# Patient Record
Sex: FEMALE | Race: WHITE | NOT HISPANIC OR LATINO
[De-identification: names, ages, dates, MRNs, and addresses within clinical notes are randomized per-mention and may not be internally consistent; named-entity substitution may affect disease eponyms.]

---

## 2014-11-05 RX ORDER — SERTRALINE 25 MG/1
1 TABLET, FILM COATED ORAL
Qty: 0 | Refills: 0 | DISCHARGE
Start: 2014-11-05

## 2017-01-18 ENCOUNTER — RX RENEWAL (OUTPATIENT)
Age: 52
End: 2017-01-18

## 2017-02-16 ENCOUNTER — APPOINTMENT (OUTPATIENT)
Dept: NEUROLOGY | Facility: CLINIC | Age: 52
End: 2017-02-16

## 2017-02-16 VITALS
SYSTOLIC BLOOD PRESSURE: 108 MMHG | HEART RATE: 80 BPM | OXYGEN SATURATION: 99 % | HEIGHT: 65 IN | DIASTOLIC BLOOD PRESSURE: 74 MMHG

## 2017-02-16 DIAGNOSIS — I95.1 ORTHOSTATIC HYPOTENSION: ICD-10-CM

## 2017-06-22 ENCOUNTER — APPOINTMENT (OUTPATIENT)
Dept: NEUROLOGY | Facility: CLINIC | Age: 52
End: 2017-06-22

## 2017-06-22 VITALS
OXYGEN SATURATION: 98 % | DIASTOLIC BLOOD PRESSURE: 82 MMHG | HEIGHT: 65 IN | BODY MASS INDEX: 27.82 KG/M2 | SYSTOLIC BLOOD PRESSURE: 120 MMHG | WEIGHT: 167 LBS | HEART RATE: 69 BPM

## 2018-01-24 ENCOUNTER — APPOINTMENT (OUTPATIENT)
Dept: NEUROLOGY | Facility: CLINIC | Age: 53
End: 2018-01-24
Payer: MEDICARE

## 2018-01-24 VITALS
WEIGHT: 175 LBS | HEART RATE: 81 BPM | BODY MASS INDEX: 29.16 KG/M2 | HEIGHT: 65 IN | SYSTOLIC BLOOD PRESSURE: 128 MMHG | DIASTOLIC BLOOD PRESSURE: 83 MMHG

## 2018-01-24 DIAGNOSIS — I63.9 CEREBRAL INFARCTION, UNSPECIFIED: ICD-10-CM

## 2018-01-24 PROCEDURE — 99214 OFFICE O/P EST MOD 30 MIN: CPT

## 2018-01-24 RX ORDER — ALPRAZOLAM 0.5 MG/1
0.5 TABLET ORAL
Qty: 45 | Refills: 0 | Status: ACTIVE | COMMUNITY
Start: 2017-08-08

## 2018-01-24 RX ORDER — PREDNISONE 5 MG/1
5 TABLET ORAL
Refills: 0 | Status: ACTIVE | COMMUNITY

## 2018-07-25 ENCOUNTER — APPOINTMENT (OUTPATIENT)
Dept: NEUROLOGY | Facility: CLINIC | Age: 53
End: 2018-07-25

## 2018-07-31 ENCOUNTER — APPOINTMENT (OUTPATIENT)
Dept: PSYCHIATRY | Facility: CLINIC | Age: 53
End: 2018-07-31
Payer: MEDICARE

## 2018-07-31 PROCEDURE — 99205 OFFICE O/P NEW HI 60 MIN: CPT

## 2018-09-04 ENCOUNTER — APPOINTMENT (OUTPATIENT)
Dept: PSYCHIATRY | Facility: CLINIC | Age: 53
End: 2018-09-04
Payer: MEDICARE

## 2018-09-04 DIAGNOSIS — K31.84 GASTROPARESIS: ICD-10-CM

## 2018-09-04 PROCEDURE — 99214 OFFICE O/P EST MOD 30 MIN: CPT

## 2018-09-04 RX ORDER — PREDNISONE 5 MG/1
5 TABLET ORAL
Qty: 135 | Refills: 0 | Status: DISCONTINUED | COMMUNITY
Start: 2017-09-20 | End: 2018-09-04

## 2018-09-04 RX ORDER — PREDNISONE 2.5 MG/1
2.5 TABLET ORAL
Refills: 0 | Status: DISCONTINUED | COMMUNITY
End: 2018-09-04

## 2018-09-04 RX ORDER — LEVOTHYROXINE SODIUM 150 UG/1
150 TABLET ORAL
Refills: 0 | Status: DISCONTINUED | COMMUNITY
End: 2018-09-04

## 2018-10-09 ENCOUNTER — APPOINTMENT (OUTPATIENT)
Dept: PSYCHIATRY | Facility: CLINIC | Age: 53
End: 2018-10-09
Payer: MEDICARE

## 2018-10-09 PROCEDURE — 99213 OFFICE O/P EST LOW 20 MIN: CPT

## 2019-01-08 ENCOUNTER — APPOINTMENT (OUTPATIENT)
Dept: PSYCHIATRY | Facility: CLINIC | Age: 54
End: 2019-01-08
Payer: MEDICARE

## 2019-01-08 DIAGNOSIS — F13.20 SEDATIVE, HYPNOTIC OR ANXIOLYTIC DEPENDENCE, UNCOMPLICATED: ICD-10-CM

## 2019-01-08 PROCEDURE — 99213 OFFICE O/P EST LOW 20 MIN: CPT

## 2019-01-08 RX ORDER — TRAZODONE HYDROCHLORIDE 50 MG/1
50 TABLET ORAL
Qty: 90 | Refills: 0 | Status: DISCONTINUED | COMMUNITY
Start: 2018-07-31 | End: 2019-01-08

## 2019-01-08 RX ORDER — ALPRAZOLAM 0.5 MG/1
0.5 TABLET ORAL
Qty: 30 | Refills: 0 | Status: DISCONTINUED | COMMUNITY
Start: 2018-07-31 | End: 2019-01-08

## 2019-03-11 ENCOUNTER — TRANSCRIPTION ENCOUNTER (OUTPATIENT)
Age: 54
End: 2019-03-11

## 2019-04-09 ENCOUNTER — APPOINTMENT (OUTPATIENT)
Dept: PSYCHIATRY | Facility: CLINIC | Age: 54
End: 2019-04-09
Payer: MEDICARE

## 2019-04-09 PROCEDURE — 99214 OFFICE O/P EST MOD 30 MIN: CPT

## 2019-07-09 ENCOUNTER — APPOINTMENT (OUTPATIENT)
Dept: PSYCHIATRY | Facility: CLINIC | Age: 54
End: 2019-07-09
Payer: MEDICARE

## 2019-07-09 PROCEDURE — 99213 OFFICE O/P EST LOW 20 MIN: CPT

## 2019-10-16 ENCOUNTER — APPOINTMENT (OUTPATIENT)
Dept: PSYCHIATRY | Facility: CLINIC | Age: 54
End: 2019-10-16
Payer: MEDICARE

## 2019-10-16 PROCEDURE — 99213 OFFICE O/P EST LOW 20 MIN: CPT

## 2019-10-16 RX ORDER — ONDANSETRON 4 MG/1
4 TABLET ORAL AS DIRECTED
Qty: 30 | Refills: 0 | Status: ACTIVE | COMMUNITY
Start: 2019-10-16 | End: 1900-01-01

## 2019-10-16 RX ORDER — GABAPENTIN 600 MG/1
600 TABLET, COATED ORAL
Qty: 270 | Refills: 0 | Status: DISCONTINUED | COMMUNITY
Start: 2018-07-31 | End: 2019-10-16

## 2019-11-13 ENCOUNTER — APPOINTMENT (OUTPATIENT)
Dept: PSYCHIATRY | Facility: CLINIC | Age: 54
End: 2019-11-13
Payer: MEDICARE

## 2019-11-13 PROCEDURE — 99214 OFFICE O/P EST MOD 30 MIN: CPT

## 2019-11-13 PROCEDURE — 90837 PSYTX W PT 60 MINUTES: CPT

## 2019-12-09 ENCOUNTER — APPOINTMENT (OUTPATIENT)
Dept: PSYCHIATRY | Facility: CLINIC | Age: 54
End: 2019-12-09
Payer: MEDICARE

## 2019-12-09 PROCEDURE — 90837 PSYTX W PT 60 MINUTES: CPT

## 2019-12-09 PROCEDURE — 99214 OFFICE O/P EST MOD 30 MIN: CPT

## 2019-12-09 RX ORDER — SERTRALINE HYDROCHLORIDE 50 MG/1
50 TABLET, FILM COATED ORAL DAILY
Qty: 30 | Refills: 0 | Status: DISCONTINUED | COMMUNITY
Start: 2019-11-13 | End: 2019-12-09

## 2019-12-09 RX ORDER — TRAZODONE HYDROCHLORIDE 50 MG/1
50 TABLET ORAL
Qty: 30 | Refills: 0 | Status: DISCONTINUED | COMMUNITY
Start: 2019-10-16 | End: 2019-12-09

## 2019-12-09 RX ORDER — MELATONIN 5 MG
5 CAPSULE ORAL
Qty: 30 | Refills: 0 | Status: DISCONTINUED | COMMUNITY
Start: 2019-11-13 | End: 2019-12-09

## 2019-12-23 ENCOUNTER — APPOINTMENT (OUTPATIENT)
Dept: PSYCHIATRY | Facility: CLINIC | Age: 54
End: 2019-12-23

## 2020-01-06 ENCOUNTER — APPOINTMENT (OUTPATIENT)
Dept: PSYCHIATRY | Facility: CLINIC | Age: 55
End: 2020-01-06
Payer: MEDICARE

## 2020-01-06 PROCEDURE — 99214 OFFICE O/P EST MOD 30 MIN: CPT

## 2020-01-06 PROCEDURE — 90837 PSYTX W PT 60 MINUTES: CPT

## 2020-01-06 RX ORDER — DOXEPIN HYDROCHLORIDE 10 MG/1
10 CAPSULE ORAL
Qty: 30 | Refills: 0 | Status: COMPLETED | COMMUNITY
Start: 2019-12-09 | End: 2020-01-06

## 2020-02-03 ENCOUNTER — APPOINTMENT (OUTPATIENT)
Dept: PSYCHIATRY | Facility: CLINIC | Age: 55
End: 2020-02-03
Payer: MEDICARE

## 2020-02-03 PROCEDURE — 90837 PSYTX W PT 60 MINUTES: CPT

## 2020-02-03 PROCEDURE — 99214 OFFICE O/P EST MOD 30 MIN: CPT

## 2020-03-09 ENCOUNTER — APPOINTMENT (OUTPATIENT)
Dept: PSYCHIATRY | Facility: CLINIC | Age: 55
End: 2020-03-09
Payer: MEDICARE

## 2020-03-09 PROCEDURE — 90837 PSYTX W PT 60 MINUTES: CPT

## 2020-04-06 ENCOUNTER — APPOINTMENT (OUTPATIENT)
Dept: PSYCHIATRY | Facility: CLINIC | Age: 55
End: 2020-04-06
Payer: MEDICARE

## 2020-04-06 PROCEDURE — 90837 PSYTX W PT 60 MINUTES: CPT | Mod: 95

## 2020-05-01 ENCOUNTER — RX RENEWAL (OUTPATIENT)
Age: 55
End: 2020-05-01

## 2020-05-04 ENCOUNTER — APPOINTMENT (OUTPATIENT)
Dept: PSYCHIATRY | Facility: CLINIC | Age: 55
End: 2020-05-04
Payer: MEDICARE

## 2020-05-04 PROCEDURE — 90837 PSYTX W PT 60 MINUTES: CPT | Mod: 95

## 2020-05-04 PROCEDURE — 99214 OFFICE O/P EST MOD 30 MIN: CPT | Mod: 95

## 2020-05-26 ENCOUNTER — RX RENEWAL (OUTPATIENT)
Age: 55
End: 2020-05-26

## 2020-06-01 ENCOUNTER — APPOINTMENT (OUTPATIENT)
Dept: PSYCHIATRY | Facility: CLINIC | Age: 55
End: 2020-06-01
Payer: MEDICARE

## 2020-06-01 PROCEDURE — 90837 PSYTX W PT 60 MINUTES: CPT | Mod: 95

## 2020-06-29 ENCOUNTER — APPOINTMENT (OUTPATIENT)
Dept: PSYCHIATRY | Facility: CLINIC | Age: 55
End: 2020-06-29
Payer: MEDICARE

## 2020-06-29 PROCEDURE — 90837 PSYTX W PT 60 MINUTES: CPT | Mod: 95

## 2020-07-27 ENCOUNTER — APPOINTMENT (OUTPATIENT)
Dept: PSYCHIATRY | Facility: CLINIC | Age: 55
End: 2020-07-27

## 2020-08-03 ENCOUNTER — APPOINTMENT (OUTPATIENT)
Dept: PSYCHIATRY | Facility: CLINIC | Age: 55
End: 2020-08-03
Payer: MEDICARE

## 2020-08-03 PROCEDURE — 99214 OFFICE O/P EST MOD 30 MIN: CPT | Mod: 95

## 2020-10-28 ENCOUNTER — APPOINTMENT (OUTPATIENT)
Dept: PSYCHIATRY | Facility: CLINIC | Age: 55
End: 2020-10-28
Payer: MEDICARE

## 2020-10-28 PROCEDURE — 90837 PSYTX W PT 60 MINUTES: CPT | Mod: 95

## 2020-10-28 PROCEDURE — 99214 OFFICE O/P EST MOD 30 MIN: CPT | Mod: 95

## 2020-12-04 ENCOUNTER — APPOINTMENT (OUTPATIENT)
Dept: PSYCHIATRY | Facility: CLINIC | Age: 55
End: 2020-12-04
Payer: MEDICARE

## 2020-12-04 PROCEDURE — 90837 PSYTX W PT 60 MINUTES: CPT | Mod: 95

## 2020-12-23 ENCOUNTER — RX RENEWAL (OUTPATIENT)
Age: 55
End: 2020-12-23

## 2021-01-07 ENCOUNTER — APPOINTMENT (OUTPATIENT)
Dept: PSYCHIATRY | Facility: CLINIC | Age: 56
End: 2021-01-07
Payer: MEDICARE

## 2021-01-07 PROCEDURE — 90837 PSYTX W PT 60 MINUTES: CPT | Mod: 95

## 2021-02-01 ENCOUNTER — APPOINTMENT (OUTPATIENT)
Dept: PSYCHIATRY | Facility: CLINIC | Age: 56
End: 2021-02-01

## 2021-02-09 ENCOUNTER — APPOINTMENT (OUTPATIENT)
Dept: PSYCHIATRY | Facility: CLINIC | Age: 56
End: 2021-02-09
Payer: MEDICARE

## 2021-02-09 PROCEDURE — 99214 OFFICE O/P EST MOD 30 MIN: CPT | Mod: 95

## 2021-02-10 ENCOUNTER — APPOINTMENT (OUTPATIENT)
Dept: PSYCHIATRY | Facility: CLINIC | Age: 56
End: 2021-02-10
Payer: MEDICARE

## 2021-02-10 PROCEDURE — 90837 PSYTX W PT 60 MINUTES: CPT | Mod: 95

## 2021-03-09 ENCOUNTER — APPOINTMENT (OUTPATIENT)
Dept: PSYCHIATRY | Facility: CLINIC | Age: 56
End: 2021-03-09
Payer: MEDICARE

## 2021-03-09 PROCEDURE — 90834 PSYTX W PT 45 MINUTES: CPT | Mod: 95

## 2021-04-13 ENCOUNTER — APPOINTMENT (OUTPATIENT)
Dept: PSYCHIATRY | Facility: CLINIC | Age: 56
End: 2021-04-13
Payer: MEDICARE

## 2021-04-13 PROCEDURE — 90834 PSYTX W PT 45 MINUTES: CPT | Mod: 95

## 2021-05-11 ENCOUNTER — APPOINTMENT (OUTPATIENT)
Dept: PSYCHIATRY | Facility: CLINIC | Age: 56
End: 2021-05-11
Payer: MEDICARE

## 2021-05-11 PROCEDURE — 90837 PSYTX W PT 60 MINUTES: CPT | Mod: 95

## 2021-06-07 ENCOUNTER — APPOINTMENT (OUTPATIENT)
Dept: PSYCHIATRY | Facility: CLINIC | Age: 56
End: 2021-06-07
Payer: MEDICARE

## 2021-06-07 DIAGNOSIS — G47.00 INSOMNIA, UNSPECIFIED: ICD-10-CM

## 2021-06-07 PROCEDURE — 90837 PSYTX W PT 60 MINUTES: CPT | Mod: 95

## 2021-06-08 PROBLEM — G47.00 INSOMNIA: Status: ACTIVE | Noted: 2019-10-16

## 2021-06-09 ENCOUNTER — APPOINTMENT (OUTPATIENT)
Dept: PSYCHIATRY | Facility: CLINIC | Age: 56
End: 2021-06-09
Payer: MEDICARE

## 2021-06-09 PROCEDURE — 99214 OFFICE O/P EST MOD 30 MIN: CPT | Mod: 95

## 2021-07-02 ENCOUNTER — RX RENEWAL (OUTPATIENT)
Age: 56
End: 2021-07-02

## 2021-07-09 ENCOUNTER — APPOINTMENT (OUTPATIENT)
Dept: PSYCHIATRY | Facility: CLINIC | Age: 56
End: 2021-07-09
Payer: MEDICARE

## 2021-07-09 PROCEDURE — 99214 OFFICE O/P EST MOD 30 MIN: CPT | Mod: 95

## 2021-07-19 ENCOUNTER — APPOINTMENT (OUTPATIENT)
Dept: PSYCHIATRY | Facility: CLINIC | Age: 56
End: 2021-07-19
Payer: MEDICARE

## 2021-07-19 PROCEDURE — 90837 PSYTX W PT 60 MINUTES: CPT | Mod: 95

## 2021-08-16 ENCOUNTER — APPOINTMENT (OUTPATIENT)
Dept: PSYCHIATRY | Facility: CLINIC | Age: 56
End: 2021-08-16
Payer: MEDICARE

## 2021-08-16 PROCEDURE — 90837 PSYTX W PT 60 MINUTES: CPT | Mod: 95

## 2021-08-19 ENCOUNTER — RX RENEWAL (OUTPATIENT)
Age: 56
End: 2021-08-19

## 2021-09-13 ENCOUNTER — APPOINTMENT (OUTPATIENT)
Dept: PSYCHIATRY | Facility: CLINIC | Age: 56
End: 2021-09-13
Payer: MEDICARE

## 2021-09-13 PROCEDURE — 90837 PSYTX W PT 60 MINUTES: CPT | Mod: 95

## 2021-09-15 ENCOUNTER — RX RENEWAL (OUTPATIENT)
Age: 56
End: 2021-09-15

## 2021-10-04 ENCOUNTER — APPOINTMENT (OUTPATIENT)
Dept: PSYCHIATRY | Facility: CLINIC | Age: 56
End: 2021-10-04
Payer: MEDICARE

## 2021-10-04 PROCEDURE — 99214 OFFICE O/P EST MOD 30 MIN: CPT | Mod: 95

## 2021-10-14 ENCOUNTER — APPOINTMENT (OUTPATIENT)
Dept: PSYCHIATRY | Facility: CLINIC | Age: 56
End: 2021-10-14
Payer: MEDICARE

## 2021-10-14 PROCEDURE — 90837 PSYTX W PT 60 MINUTES: CPT

## 2021-10-27 ENCOUNTER — APPOINTMENT (OUTPATIENT)
Dept: OTOLARYNGOLOGY | Facility: CLINIC | Age: 56
End: 2021-10-27
Payer: MEDICARE

## 2021-10-27 VITALS
WEIGHT: 155 LBS | TEMPERATURE: 97.3 F | HEART RATE: 77 BPM | SYSTOLIC BLOOD PRESSURE: 126 MMHG | DIASTOLIC BLOOD PRESSURE: 70 MMHG | HEIGHT: 65 IN | OXYGEN SATURATION: 99 % | BODY MASS INDEX: 25.83 KG/M2

## 2021-10-27 DIAGNOSIS — H92.03 OTALGIA, BILATERAL: ICD-10-CM

## 2021-10-27 DIAGNOSIS — H61.20 IMPACTED CERUMEN, UNSPECIFIED EAR: ICD-10-CM

## 2021-10-27 DIAGNOSIS — H92.09 OTALGIA, UNSPECIFIED EAR: ICD-10-CM

## 2021-10-27 PROCEDURE — 69210 REMOVE IMPACTED EAR WAX UNI: CPT

## 2021-10-27 PROCEDURE — 99203 OFFICE O/P NEW LOW 30 MIN: CPT | Mod: 25

## 2021-10-27 NOTE — PROCEDURE
[FreeTextEntry1] : Bilateral cerumen removal under microscopy [FreeTextEntry2] : Aural fullness [FreeTextEntry3] : Procedure: Removal of bilateral cerumen with microscopy assistance\par \par Pre-operative diagnosis: Aural fullness\par \par Details:\par A speculum was placed into the right external auditory canal and the ear canal and tympanic membrane was viewed under otomicroscopy. Cerumen was present within the canal. This was removed using suction and ear curette. The tympanic membrane was viewed intact. There was no evidence of middle ear effusion. An identical procedure was performed on the left side. The tympanic membrane was intact. There was no evidence of middle ear effusion.\par \par Post-operative diagnosis: bilateral cerumen

## 2021-10-27 NOTE — ASSESSMENT
[FreeTextEntry1] : 56 year old female with aural fullness secondary to cerumen impaction. Cerumen removed today.

## 2021-10-27 NOTE — REASON FOR VISIT
[Initial Evaluation] : an initial evaluation for [Ear Pain] : ear pain [FreeTextEntry2] : 56 year old female with aural fullness

## 2021-10-27 NOTE — HISTORY OF PRESENT ILLNESS
[No] : patient does not have a  history of radiation therapy [de-identified] : 56 year old female with ear pain and fullness since Friday. She tried debrox on Saturday twice and Sunday, and has been using continuously until yesterday. It has made it worse. Reports decreased hearing, denies tinnitus, vertigo, drainage.  [Ear Fullness] : ear fullness [None] : No associated symptoms are reported.

## 2021-11-22 ENCOUNTER — APPOINTMENT (OUTPATIENT)
Dept: PSYCHIATRY | Facility: CLINIC | Age: 56
End: 2021-11-22
Payer: MEDICARE

## 2021-11-22 PROCEDURE — 90834 PSYTX W PT 45 MINUTES: CPT

## 2021-12-09 ENCOUNTER — APPOINTMENT (OUTPATIENT)
Dept: OBGYN | Facility: CLINIC | Age: 56
End: 2021-12-09

## 2021-12-09 ENCOUNTER — APPOINTMENT (OUTPATIENT)
Dept: OBGYN | Facility: CLINIC | Age: 56
End: 2021-12-09
Payer: MEDICARE

## 2021-12-09 VITALS
SYSTOLIC BLOOD PRESSURE: 100 MMHG | DIASTOLIC BLOOD PRESSURE: 60 MMHG | BODY MASS INDEX: 24.99 KG/M2 | WEIGHT: 150 LBS | HEIGHT: 65 IN

## 2021-12-09 DIAGNOSIS — N83.201 UNSPECIFIED OVARIAN CYST, RIGHT SIDE: ICD-10-CM

## 2021-12-09 DIAGNOSIS — N83.202 UNSPECIFIED OVARIAN CYST, RIGHT SIDE: ICD-10-CM

## 2021-12-09 DIAGNOSIS — N83.209 UNSPECIFIED OVARIAN CYST, UNSPECIFIED SIDE: ICD-10-CM

## 2021-12-09 PROCEDURE — 99203 OFFICE O/P NEW LOW 30 MIN: CPT

## 2021-12-09 RX ORDER — LEVOTHYROXINE SODIUM 150 UG/1
150 TABLET ORAL
Refills: 0 | Status: ACTIVE | COMMUNITY
Start: 2021-12-09

## 2021-12-09 RX ORDER — ATORVASTATIN CALCIUM 10 MG/1
10 TABLET, FILM COATED ORAL
Refills: 0 | Status: ACTIVE | COMMUNITY
Start: 2021-12-09

## 2021-12-09 RX ORDER — TOPIRAMATE 100 MG/1
100 TABLET, COATED ORAL
Refills: 0 | Status: ACTIVE | COMMUNITY
Start: 2021-12-09

## 2021-12-09 NOTE — PLAN
[FreeTextEntry1] : Discussed the management of complex ovarian cysts at length.  Risks including but not limited to torsion, rupture and malignancy were discussed.  With findings consistent with probable pathologic ovarian cyst recommend surgical intervention.  Patient to have Chastity today and dependent on the findings we will consider conservative management versus surgical management due to the patient's extensive medical history if Howe negative patient to have follow-up ultrasound in 3 months and will discuss plan upon obtaining the results.\par

## 2021-12-09 NOTE — HISTORY OF PRESENT ILLNESS
[FreeTextEntry1] : Pt is a 57 yo P3 postmenopausal woman (LMP= 10 years  ago) presenting for consultation for ovarian cyst. She has had these cysts for several years and they were found when she presented to her gyn with pelvic pain. Pain is dscribed as a dull ache on both sides.  NO AUB. She has a significant PMH of Cushing disease, CVA and would like to consider to have her adenxa removed to prevent further problems in the future. \par \par TVUS: 2021: uterus is 5x2.5x3.6cm, endometrium is 0.7mm\par posterior fundal subendometrial cyst measuring  5y1m3cf\par RO: Miniamlly complex cyst with septations 3.5X3.5X2.9 CM\par LO:  Minimally compex cyst with septation 4.9x3.4x3.6cm, \par \par \par \par \par OBHx:  x3 \par GYNHX: denied hx of abn pap\par PMSH: Cushing disease, CVA/stroke, depression, anxiety\par PSHx: s/p lsc cholecystectomy, TOT, excision of pituitary gland\par Meds:asa, Bupropion, DDAVP, prednisone, Sertraline , Zolof, Synthroid \par All: Sulfa \par \par Endo= ALEX Fernando\par GI= Dr. Adelia NG\par Neurologist= Dr. Bell \par

## 2021-12-20 ENCOUNTER — APPOINTMENT (OUTPATIENT)
Dept: PSYCHIATRY | Facility: CLINIC | Age: 56
End: 2021-12-20
Payer: MEDICARE

## 2021-12-20 PROCEDURE — 90837 PSYTX W PT 60 MINUTES: CPT

## 2022-01-10 LAB
CA 125 (LABCORP): 16.3 U/ML
HE4X: 60.5 PMOL/L
POSTMENOPAUSAL ROMA: 1.44
PREMENOPAUSAL ROMA: 1.13
ROMA COMMENT: NORMAL

## 2022-01-25 ENCOUNTER — APPOINTMENT (OUTPATIENT)
Dept: PSYCHIATRY | Facility: CLINIC | Age: 57
End: 2022-01-25
Payer: MEDICARE

## 2022-01-25 PROCEDURE — 90837 PSYTX W PT 60 MINUTES: CPT | Mod: 95

## 2022-01-25 PROCEDURE — 99214 OFFICE O/P EST MOD 30 MIN: CPT | Mod: 95

## 2022-01-25 RX ORDER — BUPROPION HYDROCHLORIDE 75 MG/1
75 TABLET, FILM COATED ORAL
Qty: 30 | Refills: 0 | Status: COMPLETED | COMMUNITY
Start: 2021-06-09 | End: 2022-01-25

## 2022-02-22 ENCOUNTER — APPOINTMENT (OUTPATIENT)
Dept: PSYCHIATRY | Facility: CLINIC | Age: 57
End: 2022-02-22
Payer: MEDICARE

## 2022-02-22 PROCEDURE — 90837 PSYTX W PT 60 MINUTES: CPT | Mod: 95

## 2022-02-28 ENCOUNTER — RX RENEWAL (OUTPATIENT)
Age: 57
End: 2022-02-28

## 2022-03-22 ENCOUNTER — APPOINTMENT (OUTPATIENT)
Dept: PSYCHIATRY | Facility: CLINIC | Age: 57
End: 2022-03-22
Payer: MEDICARE

## 2022-03-22 PROCEDURE — 90837 PSYTX W PT 60 MINUTES: CPT | Mod: 95

## 2022-04-16 ENCOUNTER — APPOINTMENT (OUTPATIENT)
Dept: OBGYN | Facility: CLINIC | Age: 57
End: 2022-04-16
Payer: MEDICARE

## 2022-04-26 ENCOUNTER — APPOINTMENT (OUTPATIENT)
Dept: PSYCHIATRY | Facility: CLINIC | Age: 57
End: 2022-04-26
Payer: MEDICARE

## 2022-04-26 PROCEDURE — 90837 PSYTX W PT 60 MINUTES: CPT

## 2022-04-30 ENCOUNTER — NON-APPOINTMENT (OUTPATIENT)
Age: 57
End: 2022-04-30

## 2022-04-30 ENCOUNTER — APPOINTMENT (OUTPATIENT)
Dept: OBGYN | Facility: CLINIC | Age: 57
End: 2022-04-30
Payer: MEDICARE

## 2022-04-30 PROCEDURE — 99443: CPT | Mod: 95

## 2022-05-09 ENCOUNTER — NON-APPOINTMENT (OUTPATIENT)
Age: 57
End: 2022-05-09

## 2022-05-17 ENCOUNTER — APPOINTMENT (OUTPATIENT)
Dept: OBGYN | Facility: CLINIC | Age: 57
End: 2022-05-17
Payer: MEDICARE

## 2022-05-17 PROCEDURE — 36415 COLL VENOUS BLD VENIPUNCTURE: CPT

## 2022-05-25 ENCOUNTER — APPOINTMENT (OUTPATIENT)
Dept: PSYCHIATRY | Facility: CLINIC | Age: 57
End: 2022-05-25
Payer: MEDICARE

## 2022-05-25 PROCEDURE — 90837 PSYTX W PT 60 MINUTES: CPT

## 2022-05-27 ENCOUNTER — APPOINTMENT (OUTPATIENT)
Dept: PSYCHIATRY | Facility: CLINIC | Age: 57
End: 2022-05-27
Payer: MEDICARE

## 2022-05-27 PROCEDURE — 99214 OFFICE O/P EST MOD 30 MIN: CPT | Mod: 95

## 2022-06-12 LAB
CA 125 (LABCORP): 14.9 U/ML
HE4X: 78.8 PMOL/L
POSTMENOPAUSAL ROMA: 1.72
PREMENOPAUSAL ROMA: 1.92
ROMA COMMENT: NORMAL

## 2022-06-27 ENCOUNTER — OUTPATIENT (OUTPATIENT)
Dept: OUTPATIENT SERVICES | Facility: HOSPITAL | Age: 57
LOS: 1 days | End: 2022-06-27
Payer: MEDICARE

## 2022-06-27 VITALS
WEIGHT: 138.01 LBS | TEMPERATURE: 98 F | HEART RATE: 61 BPM | RESPIRATION RATE: 17 BRPM | OXYGEN SATURATION: 99 % | HEIGHT: 65 IN | SYSTOLIC BLOOD PRESSURE: 113 MMHG | DIASTOLIC BLOOD PRESSURE: 79 MMHG

## 2022-06-27 DIAGNOSIS — Z01.818 ENCOUNTER FOR OTHER PREPROCEDURAL EXAMINATION: ICD-10-CM

## 2022-06-27 DIAGNOSIS — Z98.89 OTHER SPECIFIED POSTPROCEDURAL STATES: Chronic | ICD-10-CM

## 2022-06-27 DIAGNOSIS — N83.209 UNSPECIFIED OVARIAN CYST, UNSPECIFIED SIDE: ICD-10-CM

## 2022-06-27 LAB
ANION GAP SERPL CALC-SCNC: 9 MMOL/L — SIGNIFICANT CHANGE UP (ref 5–17)
BLD GP AB SCN SERPL QL: NEGATIVE — SIGNIFICANT CHANGE UP
BUN SERPL-MCNC: 22 MG/DL — SIGNIFICANT CHANGE UP (ref 7–23)
CALCIUM SERPL-MCNC: 9.1 MG/DL — SIGNIFICANT CHANGE UP (ref 8.4–10.5)
CHLORIDE SERPL-SCNC: 106 MMOL/L — SIGNIFICANT CHANGE UP (ref 96–108)
CO2 SERPL-SCNC: 25 MMOL/L — SIGNIFICANT CHANGE UP (ref 22–31)
CREAT SERPL-MCNC: 0.92 MG/DL — SIGNIFICANT CHANGE UP (ref 0.5–1.3)
EGFR: 72 ML/MIN/1.73M2 — SIGNIFICANT CHANGE UP
GLUCOSE SERPL-MCNC: 88 MG/DL — SIGNIFICANT CHANGE UP (ref 70–99)
HCT VFR BLD CALC: 35 % — SIGNIFICANT CHANGE UP (ref 34.5–45)
HGB BLD-MCNC: 11.4 G/DL — LOW (ref 11.5–15.5)
MCHC RBC-ENTMCNC: 28.4 PG — SIGNIFICANT CHANGE UP (ref 27–34)
MCHC RBC-ENTMCNC: 32.6 GM/DL — SIGNIFICANT CHANGE UP (ref 32–36)
MCV RBC AUTO: 87.1 FL — SIGNIFICANT CHANGE UP (ref 80–100)
NRBC # BLD: 0 /100 WBCS — SIGNIFICANT CHANGE UP (ref 0–0)
PLATELET # BLD AUTO: 154 K/UL — SIGNIFICANT CHANGE UP (ref 150–400)
POTASSIUM SERPL-MCNC: 3.5 MMOL/L — SIGNIFICANT CHANGE UP (ref 3.5–5.3)
POTASSIUM SERPL-SCNC: 3.5 MMOL/L — SIGNIFICANT CHANGE UP (ref 3.5–5.3)
RBC # BLD: 4.02 M/UL — SIGNIFICANT CHANGE UP (ref 3.8–5.2)
RBC # FLD: 13.1 % — SIGNIFICANT CHANGE UP (ref 10.3–14.5)
RH IG SCN BLD-IMP: POSITIVE — SIGNIFICANT CHANGE UP
SODIUM SERPL-SCNC: 140 MMOL/L — SIGNIFICANT CHANGE UP (ref 135–145)
WBC # BLD: 5.65 K/UL — SIGNIFICANT CHANGE UP (ref 3.8–10.5)
WBC # FLD AUTO: 5.65 K/UL — SIGNIFICANT CHANGE UP (ref 3.8–10.5)

## 2022-06-27 PROCEDURE — 86900 BLOOD TYPING SEROLOGIC ABO: CPT

## 2022-06-27 PROCEDURE — 36415 COLL VENOUS BLD VENIPUNCTURE: CPT

## 2022-06-27 PROCEDURE — 86850 RBC ANTIBODY SCREEN: CPT

## 2022-06-27 PROCEDURE — 85027 COMPLETE CBC AUTOMATED: CPT

## 2022-06-27 PROCEDURE — 80048 BASIC METABOLIC PNL TOTAL CA: CPT

## 2022-06-27 PROCEDURE — 86901 BLOOD TYPING SEROLOGIC RH(D): CPT

## 2022-06-27 PROCEDURE — G0463: CPT

## 2022-06-27 RX ORDER — ACETAMINOPHEN 500 MG
1000 TABLET ORAL ONCE
Refills: 0 | Status: COMPLETED | OUTPATIENT
Start: 2022-07-18 | End: 2022-07-18

## 2022-06-27 RX ORDER — SODIUM CHLORIDE 9 MG/ML
3 INJECTION INTRAMUSCULAR; INTRAVENOUS; SUBCUTANEOUS EVERY 8 HOURS
Refills: 0 | Status: DISCONTINUED | OUTPATIENT
Start: 2022-07-18 | End: 2022-07-18

## 2022-06-27 RX ORDER — GABAPENTIN 400 MG/1
600 CAPSULE ORAL ONCE
Refills: 0 | Status: COMPLETED | OUTPATIENT
Start: 2022-07-18 | End: 2022-07-18

## 2022-06-27 RX ORDER — CEFOTETAN DISODIUM 1 G
2 VIAL (EA) INJECTION ONCE
Refills: 0 | Status: COMPLETED | OUTPATIENT
Start: 2022-07-18 | End: 2022-07-18

## 2022-06-27 RX ORDER — CHLORHEXIDINE GLUCONATE 213 G/1000ML
1 SOLUTION TOPICAL ONCE
Refills: 0 | Status: COMPLETED | OUTPATIENT
Start: 2022-07-18 | End: 2022-07-18

## 2022-06-27 NOTE — H&P PST ADULT - NSANTHOSAYNRD_GEN_A_CORE
No. DAWSON screening performed.  STOP BANG Legend: 0-2 = LOW Risk; 3-4 = INTERMEDIATE Risk; 5-8 = HIGH Risk

## 2022-06-27 NOTE — H&P PST ADULT - PROBLEM SELECTOR PLAN 1
Patient for b/l salpingo-oophorectomy on 7/18/22  Preop instructions given and all questions answered  CHG soap given  Patient instructed to drink 20oz gatorade 2 hrs preop.  Patient to go for medical clearance on 7/11

## 2022-06-27 NOTE — H&P PST ADULT - ATTENDING COMMENTS
Pt  with addisons and DI for scope bso the risks and alterantives have been discussed and pt was cleared by endocrine aware of need for stress steroids and ddavp asw well ad isdotonic solutions..

## 2022-06-27 NOTE — H&P PST ADULT - HISTORY OF PRESENT ILLNESS
57 year old female with PMH depression, anxiety, ovarian cysts (with pelvic pain) cushing disease, CVA/stroke (10/2014 x2) with residual vision loss, cushing disease s/p pituitary removal, migraines on topiramate,     gastroparesis hypothyroid,     loop recorder     covid hx - no previous infection  covid vacc J&J - 3/5/2021 Booster 10/25/2021           57 year old female with PMH depression, anxiety, hypothyroid, gastroparesis, CVA/stroke (10/2014 x2) with residual vision loss, cushing disease s/p pituitary removal, migraines on topiramate, and ovarian cysts presents today for presurgical testing. Patient reports she felt a dull pelvic pain x 2 months, ovarian cysts found on sonogram. Patient denies dysuria, hematuria, flank pain, and fever. Patient now for laparoscopic bilateral salpingo-oophorectomy on 7/18/2022.     Covid swab 7/15 @ [facility]   Denies previous covid infection

## 2022-06-27 NOTE — H&P PST ADULT - NSICDXPASTSURGICALHX_GEN_ALL_CORE_FT
PAST SURGICAL HISTORY:  History of cholecystectomy 1993    History of tonsillectomy as a child    Status post transsphenoidal pituitary resection 2009

## 2022-06-27 NOTE — H&P PST ADULT - NSICDXPASTMEDICALHX_GEN_ALL_CORE_FT
PAST MEDICAL HISTORY:  Cerebrovascular accident (stroke) in  October 2014 X 2    Cushing disease     Depression     GERD (gastroesophageal reflux disease)     Hypopituitarism     Hypotension     Migraine      PAST MEDICAL HISTORY:  Anxiety     Cerebrovascular accident (stroke) in  October 2014 X 2    Cushing disease     Depression     Gastroparesis     GERD (gastroesophageal reflux disease)     Hypopituitarism     Hypothyroid     Migraine on topiramate    Ovarian cyst

## 2022-06-29 ENCOUNTER — APPOINTMENT (OUTPATIENT)
Dept: PSYCHIATRY | Facility: CLINIC | Age: 57
End: 2022-06-29

## 2022-06-29 PROCEDURE — 90837 PSYTX W PT 60 MINUTES: CPT | Mod: 95

## 2022-06-30 PROBLEM — K31.84 GASTROPARESIS: Chronic | Status: ACTIVE | Noted: 2022-06-27

## 2022-06-30 PROBLEM — E03.9 HYPOTHYROIDISM, UNSPECIFIED: Chronic | Status: ACTIVE | Noted: 2022-06-27

## 2022-06-30 PROBLEM — F41.9 ANXIETY DISORDER, UNSPECIFIED: Chronic | Status: ACTIVE | Noted: 2022-06-27

## 2022-06-30 PROBLEM — N83.209 UNSPECIFIED OVARIAN CYST, UNSPECIFIED SIDE: Chronic | Status: ACTIVE | Noted: 2022-06-27

## 2022-07-15 ENCOUNTER — OUTPATIENT (OUTPATIENT)
Dept: OUTPATIENT SERVICES | Facility: HOSPITAL | Age: 57
LOS: 1 days | End: 2022-07-15
Payer: MEDICARE

## 2022-07-15 DIAGNOSIS — Z98.89 OTHER SPECIFIED POSTPROCEDURAL STATES: Chronic | ICD-10-CM

## 2022-07-15 DIAGNOSIS — Z11.52 ENCOUNTER FOR SCREENING FOR COVID-19: ICD-10-CM

## 2022-07-15 LAB — SARS-COV-2 RNA SPEC QL NAA+PROBE: SIGNIFICANT CHANGE UP

## 2022-07-15 PROCEDURE — C9803: CPT

## 2022-07-15 PROCEDURE — U0003: CPT

## 2022-07-15 PROCEDURE — U0005: CPT

## 2022-07-17 ENCOUNTER — TRANSCRIPTION ENCOUNTER (OUTPATIENT)
Age: 57
End: 2022-07-17

## 2022-07-18 ENCOUNTER — RESULT REVIEW (OUTPATIENT)
Age: 57
End: 2022-07-18

## 2022-07-18 ENCOUNTER — OUTPATIENT (OUTPATIENT)
Dept: OUTPATIENT SERVICES | Facility: HOSPITAL | Age: 57
LOS: 1 days | End: 2022-07-18
Payer: MEDICARE

## 2022-07-18 ENCOUNTER — APPOINTMENT (OUTPATIENT)
Dept: OBGYN | Facility: HOSPITAL | Age: 57
End: 2022-07-18

## 2022-07-18 ENCOUNTER — TRANSCRIPTION ENCOUNTER (OUTPATIENT)
Age: 57
End: 2022-07-18

## 2022-07-18 VITALS
OXYGEN SATURATION: 99 % | TEMPERATURE: 97 F | RESPIRATION RATE: 16 BRPM | HEART RATE: 66 BPM | DIASTOLIC BLOOD PRESSURE: 54 MMHG | SYSTOLIC BLOOD PRESSURE: 98 MMHG

## 2022-07-18 VITALS
HEIGHT: 65 IN | TEMPERATURE: 98 F | WEIGHT: 138.01 LBS | RESPIRATION RATE: 14 BRPM | HEART RATE: 70 BPM | OXYGEN SATURATION: 100 % | SYSTOLIC BLOOD PRESSURE: 101 MMHG | DIASTOLIC BLOOD PRESSURE: 70 MMHG

## 2022-07-18 DIAGNOSIS — Z98.89 OTHER SPECIFIED POSTPROCEDURAL STATES: Chronic | ICD-10-CM

## 2022-07-18 DIAGNOSIS — N83.201 UNSPECIFIED OVARIAN CYST, RIGHT SIDE: ICD-10-CM

## 2022-07-18 DIAGNOSIS — Z01.818 ENCOUNTER FOR OTHER PREPROCEDURAL EXAMINATION: ICD-10-CM

## 2022-07-18 DIAGNOSIS — N83.209 UNSPECIFIED OVARIAN CYST, UNSPECIFIED SIDE: ICD-10-CM

## 2022-07-18 PROCEDURE — C9399: CPT

## 2022-07-18 PROCEDURE — 86850 RBC ANTIBODY SCREEN: CPT

## 2022-07-18 PROCEDURE — 88307 TISSUE EXAM BY PATHOLOGIST: CPT

## 2022-07-18 PROCEDURE — 58661 LAPAROSCOPY REMOVE ADNEXA: CPT

## 2022-07-18 PROCEDURE — 88307 TISSUE EXAM BY PATHOLOGIST: CPT | Mod: 26

## 2022-07-18 PROCEDURE — 86901 BLOOD TYPING SEROLOGIC RH(D): CPT

## 2022-07-18 PROCEDURE — 88112 CYTOPATH CELL ENHANCE TECH: CPT

## 2022-07-18 PROCEDURE — 88112 CYTOPATH CELL ENHANCE TECH: CPT | Mod: 26

## 2022-07-18 PROCEDURE — 86900 BLOOD TYPING SEROLOGIC ABO: CPT

## 2022-07-18 PROCEDURE — 58661 LAPAROSCOPY REMOVE ADNEXA: CPT | Mod: 80

## 2022-07-18 RX ORDER — TOPIRAMATE 25 MG
2 TABLET ORAL
Qty: 0 | Refills: 0 | DISCHARGE

## 2022-07-18 RX ORDER — LIDOCAINE HCL 20 MG/ML
0.2 VIAL (ML) INJECTION ONCE
Refills: 0 | Status: COMPLETED | OUTPATIENT
Start: 2022-07-18 | End: 2022-07-18

## 2022-07-18 RX ORDER — HYDROMORPHONE HYDROCHLORIDE 2 MG/ML
0.25 INJECTION INTRAMUSCULAR; INTRAVENOUS; SUBCUTANEOUS
Refills: 0 | Status: DISCONTINUED | OUTPATIENT
Start: 2022-07-18 | End: 2022-07-18

## 2022-07-18 RX ORDER — DESMOPRESSIN ACETATE 0.1 MG/1
0.2 TABLET ORAL ONCE
Refills: 0 | Status: COMPLETED | OUTPATIENT
Start: 2022-07-18 | End: 2022-07-18

## 2022-07-18 RX ORDER — SODIUM CHLORIDE 9 MG/ML
1000 INJECTION, SOLUTION INTRAVENOUS
Refills: 0 | Status: DISCONTINUED | OUTPATIENT
Start: 2022-07-18 | End: 2022-08-01

## 2022-07-18 RX ORDER — FENTANYL CITRATE 50 UG/ML
25 INJECTION INTRAVENOUS
Refills: 0 | Status: DISCONTINUED | OUTPATIENT
Start: 2022-07-18 | End: 2022-07-18

## 2022-07-18 RX ORDER — DESMOPRESSIN ACETATE 0.1 MG/1
0.2 TABLET ORAL ONCE
Refills: 0 | Status: DISCONTINUED | OUTPATIENT
Start: 2022-07-18 | End: 2022-07-18

## 2022-07-18 RX ORDER — LEVOTHYROXINE SODIUM 125 MCG
1 TABLET ORAL
Qty: 0 | Refills: 0 | DISCHARGE

## 2022-07-18 RX ORDER — ATORVASTATIN CALCIUM 80 MG/1
1 TABLET, FILM COATED ORAL
Qty: 0 | Refills: 0 | DISCHARGE

## 2022-07-18 RX ORDER — DESMOPRESSIN ACETATE 0.1 MG/1
1 TABLET ORAL
Qty: 0 | Refills: 0 | DISCHARGE

## 2022-07-18 RX ORDER — DESMOPRESSIN ACETATE 0.1 MG/1
0.2 TABLET ORAL ONCE
Refills: 0 | Status: DISCONTINUED | OUTPATIENT
Start: 2022-07-19 | End: 2022-07-18

## 2022-07-18 RX ORDER — RIMEGEPANT SULFATE 75 MG/75MG
1 TABLET, ORALLY DISINTEGRATING ORAL
Qty: 0 | Refills: 0 | DISCHARGE

## 2022-07-18 RX ADMIN — DESMOPRESSIN ACETATE 0.2 MILLIGRAM(S): 0.1 TABLET ORAL at 22:33

## 2022-07-18 RX ADMIN — Medication 20 MILLIGRAM(S): at 22:33

## 2022-07-18 RX ADMIN — SODIUM CHLORIDE 30 MILLILITER(S): 9 INJECTION, SOLUTION INTRAVENOUS at 19:35

## 2022-07-18 RX ADMIN — GABAPENTIN 600 MILLIGRAM(S): 400 CAPSULE ORAL at 15:30

## 2022-07-18 RX ADMIN — Medication 1000 MILLIGRAM(S): at 15:30

## 2022-07-18 RX ADMIN — CHLORHEXIDINE GLUCONATE 1 APPLICATION(S): 213 SOLUTION TOPICAL at 12:00

## 2022-07-18 NOTE — BRIEF OPERATIVE NOTE - OPERATION/FINDINGS
EUA: grossly normal external genitalia. 5cm axial uterus. Adnexal fullness bl  Lsc: Entry site atraumatic, peritoneum, bowel, bl fallopian tubes and uterus grossly normal. Bl ovaries each with approx 4cm simple ovarian cysts.

## 2022-07-18 NOTE — ASU PREOP CHECKLIST - COMMENTS
Medication taken this morning with a sip of water as ordered, patient drank Gatorade as instructed at 11am

## 2022-07-18 NOTE — DISCHARGE NOTE NURSING/CASE MANAGEMENT/SOCIAL WORK - PATIENT PORTAL LINK FT
You can access the FollowMyHealth Patient Portal offered by [facility] by registering at the following website: [email or web address]. By joining [facility]’s FollowMyHealth portal, you will also be able to view your health information using other applications (apps) compatible with our system.

## 2022-07-18 NOTE — CHART NOTE - NSCHARTNOTEFT_GEN_A_CORE
Spoke with patient's endocrinologist Dr. Fernando:    Plan outlined in scanned in record if patient stays overnight regarding home medications.    If patient is discharged tonight: give DDAVP 0.2mg PM dose, prednisone 20mg PO at 10:30pm. POD#1 patient should taken prednisone 20mg PO, on POD#2 if feeling well she will take prednisone 10mg PO, on POD#3 she will return to usual 4mg prednisone dose.     Fluids will be run at KVO not at 125mL/hr for DI.  If patient is extremely groggy, unable to have PO intake, or urine is high she should stay overnight and endocrinology should be consulted for management of her DI.    ANH Sweeney [facility]

## 2022-07-18 NOTE — ASU DISCHARGE PLAN (ADULT/PEDIATRIC) - ASU DC SPECIAL INSTRUCTIONSFT
Please make an appointment to see your doctor as ordered.   Nothing per vagina - no douching, no tampons, no sex - for 2 weeks.   Please call your doctor if you develop: fevers, nausea or vomiting, or have pain not relieved by motrin and/or tylenol.     Please follow plan for steroids and taper per your endocrinologist. Please make an appointment to see your doctor as ordered.   Nothing per vagina - no douching, no tampons, no sex - for 2 weeks.   Please call your doctor if you develop: fevers, nausea or vomiting, or have pain not relieved by motrin and/or tylenol.     Please follow the instructions given by your endocrinologist for your steroid taper as below (doses for day 1 and 2 sent to pharmacy)     Day 1 - Tuesday, 7/19/22, please take 20mg prednisone   Day 2- Wednesday, 7/20/22, please take 10mg prednisone   Day 3 and beyond - Thursday, 7/21/22, please return to your normal dose of 4mg prednisone daily.    Please call your endocrinologist if you have any questions!

## 2022-07-18 NOTE — ASU DISCHARGE PLAN (ADULT/PEDIATRIC) - CARE PROVIDER_API CALL
Kannan Foster)  Obstetrics and Gynecology  [address] [place] [address], [address]  [place] [address]  Phone: [phone]  Fax: [phone]  Follow Up Time:

## 2022-07-18 NOTE — CHART NOTE - NSCHARTNOTEFT_GEN_A_CORE
R2 OBGYN POST-OP CHECK    S: Patient seen and evaluated at bedside.  Pt awake and sitting up in chair.  Patient reports pain controlled with analgesia. Pt denies N/V, SOB, CP, palpitations, fever/chills. Tolerating regular diet.     O:   T(C): 36.2 (07-18-22 @ 18:30), Max: 36.2 (07-18-22 @ 18:30)  HR: 69 (07-18-22 @ 20:30) (69 - 85)  BP: 98/51 (07-18-22 @ 20:30) (90/52 - 117/56)  RR: 17 (07-18-22 @ 20:30) (14 - 17)  SpO2: 100% (07-18-22 @ 20:30) (98% - 100%)  Wt(kg): --  I&O's Summary      Gen: Resting comfortably in bed, NAD  CV: S1S2, RRR  Lungs: CTA B/L  Abd: soft, appropriately tender, occasional BS x 4 quadrants.    Inc: Clean/dry/intact w/ bandage in place  Ext: SCD's in place and functional, non-tender b/l, no edema        A/P: 57y Female h/p Cushings disease and DI s/p LSC BSO, clinically stable, meeting postoperative milestones.     Neuro: PO Analgesia PRN   CV: Hemodynamically stable.  Monitor VS.  Pulm: Saturating well on room air.  Encourage OOB and incentive spirometer use.   GI: Advance to regular diet. Anti-emetics PRN.  : DTV by 12am.  FEN: Electrolytes:   Heme: DVT ppx w/ SCD's while in bed. Early ambulation, initially with assistance then as tolerated.    ID: Afebrile  Endo: KVO fluids, f/u UOP. DDAVP 0.2mg to be given at 10:30pm. Will f/u void to determine if pt will stay overnight vs. discharge to home to determine prednisone dose.   Dispo: Discharge from PACU when criteria met.     SILVINA Arambula, PGY2

## 2022-07-18 NOTE — PRE-ANESTHESIA EVALUATION ADULT - NSANTHPMHFT_GEN_ALL_CORE
56 yo F with PMH of depression, anxiety, hypothyroid, gastroparesis s/p botox injections, CVA/stroke (10/2014 x2; cause unknown) with residual vision loss, cushing disease s/p pituitary removal, and ovarian cysts who presents for laparoscopic bilateral salpingo-oophorectomy. Patient cleared for planned procedure by PCP, endocrine, and neuro. Plan for ddavp and steroids per endos recommendations.

## 2022-07-20 LAB — NON-GYNECOLOGICAL CYTOLOGY STUDY: SIGNIFICANT CHANGE UP

## 2022-07-22 LAB — SURGICAL PATHOLOGY STUDY: SIGNIFICANT CHANGE UP

## 2022-07-28 ENCOUNTER — APPOINTMENT (OUTPATIENT)
Dept: PSYCHIATRY | Facility: CLINIC | Age: 57
End: 2022-07-28

## 2022-07-28 DIAGNOSIS — F43.10 POST-TRAUMATIC STRESS DISORDER, UNSPECIFIED: ICD-10-CM

## 2022-07-28 PROCEDURE — 90837 PSYTX W PT 60 MINUTES: CPT | Mod: 95

## 2022-07-29 PROBLEM — F43.10 PTSD (POST-TRAUMATIC STRESS DISORDER): Status: ACTIVE | Noted: 2019-10-16

## 2022-08-04 ENCOUNTER — APPOINTMENT (OUTPATIENT)
Dept: OBGYN | Facility: CLINIC | Age: 57
End: 2022-08-04

## 2022-08-04 ENCOUNTER — TRANSCRIPTION ENCOUNTER (OUTPATIENT)
Age: 57
End: 2022-08-04

## 2022-08-04 VITALS
HEIGHT: 65 IN | SYSTOLIC BLOOD PRESSURE: 110 MMHG | DIASTOLIC BLOOD PRESSURE: 68 MMHG | BODY MASS INDEX: 21.99 KG/M2 | WEIGHT: 132 LBS

## 2022-08-04 PROCEDURE — 99024 POSTOP FOLLOW-UP VISIT: CPT

## 2022-08-24 ENCOUNTER — APPOINTMENT (OUTPATIENT)
Dept: PSYCHIATRY | Facility: CLINIC | Age: 57
End: 2022-08-24

## 2022-08-24 PROCEDURE — 90837 PSYTX W PT 60 MINUTES: CPT | Mod: 95

## 2022-09-08 ENCOUNTER — APPOINTMENT (OUTPATIENT)
Dept: OBGYN | Facility: CLINIC | Age: 57
End: 2022-09-08

## 2022-09-08 VITALS
SYSTOLIC BLOOD PRESSURE: 108 MMHG | BODY MASS INDEX: 22.16 KG/M2 | WEIGHT: 133 LBS | HEIGHT: 65 IN | DIASTOLIC BLOOD PRESSURE: 74 MMHG | HEART RATE: 75 BPM

## 2022-09-08 PROCEDURE — 99212 OFFICE O/P EST SF 10 MIN: CPT

## 2022-09-14 NOTE — CHIEF COMPLAINT
[Post Op Day: ___] : post op day #[unfilled] [de-identified] : Laparoscopic Bilateral Salpingo-oophorectomy.

## 2022-09-21 ENCOUNTER — APPOINTMENT (OUTPATIENT)
Dept: PSYCHIATRY | Facility: CLINIC | Age: 57
End: 2022-09-21

## 2022-10-29 NOTE — PLAN
[Sutures Removed] : sutures were removed [No [place]] : to avoid sexual intercourse [Unlimited ADLs] : to participate in activities of daily living without limitations as pain allows [Limited Work] : to work with limitations [Limited Housework] : to do housework with limitations [Limited Sports___] : to participate in sports with limitations~U: [unfilled] [FreeTextEntry1] : RTO 9/8 for final post-op check and clearance for all activities.\par \par

## 2022-10-29 NOTE — ASSESSMENT
[Doing Well] : is doing well [Excellent Pain Control] : has excellent pain control [No Sign of Infection] : is showing no signs of infection [de-identified] : 56yo POD#17 s/p single site lsc BSO for bl ovarian cysts.

## 2022-10-29 NOTE — REASON FOR VISIT
[Post Op Day: ___] : Post-Op Day:  #[unfilled] [de-identified] : Laparoscopic  Bilateral Salpingo-oophorectomy

## 2022-10-29 NOTE — HISTORY OF PRESENT ILLNESS
[Pain is well-controlled] : pain is well-controlled [Healed] : healed [None] : no vaginal bleeding [Pathology reviewed] : pathology reviewed [Fever] : no fever [Chills] : no chills [Nausea] : no nausea [Vomiting] : no vomiting [Diarrhea] : no diarrhea [Vaginal Bleeding] : no vaginal bleeding [Pelvic Pressure] : no pelvic pressure [Dysuria] : no dysuria [Vaginal Discharge] : no vaginal discharge [Constipation] : no constipation [Erythema] : not erythematous [Swelling] : not swollen [Dehiscence] : not dehisced [Discharge] : absent of discharge [de-identified] : small scab removed over umbilical incision, overall healing well. Dilute Hydrogen peroxide applied.

## 2022-11-23 NOTE — DISCHARGE NOTE NURSING/CASE MANAGEMENT/SOCIAL WORK - NSDCVIVACCINE_GEN_ALL_CORE_FT
influenza, injectable, quadrivalent, preservative free; 06-Nov-2014 14:24; Dahiana VegaRN); [employer]; [id number]; IntraMuscular; Deltoid Right.; 0.5 milliLiter(s);   
Pt. is an 82 y.o male initially p/w LE swelling and cough, admitted for management of CHF/CKD. Hospital course c/b sepsis secondary PNA requiring pressors support and intubation on 11/21, extubated on 11/23, weaned to HFNC and subsequently to 6L/min via NC.

## 2022-12-08 ENCOUNTER — APPOINTMENT (OUTPATIENT)
Dept: PSYCHIATRY | Facility: CLINIC | Age: 57
End: 2022-12-08

## 2022-12-08 PROCEDURE — 90837 PSYTX W PT 60 MINUTES: CPT | Mod: 95

## 2022-12-09 NOTE — END OF VISIT
[Duration of Psychotherapy Visit (minutes spent in synchronous communication): ____] : Duration of Psychotherapy Visit (minutes spent in synchronous communication): [unfilled] [Individual Psychotherapy for 53+ minutes] : Individual Psychotherapy for 53+ minutes  [Teletherapy Service Provided] : The services provided in this session were delivered via tele-therapy

## 2022-12-09 NOTE — REASON FOR VISIT
[Duration of Psychotherapy Visit (minutes spent in synchronous communication): ____] : Duration of Psychotherapy Visit (minutes spent in synchronous communication): [unfilled] [Individual Psychotherapy for 53+ minutes] : Individual Psychotherapy for 53+ minutes  [Teletherapy Service Provided] : The services provided in this session were delivered via tele-therapy [Home] : at home, [unfilled] , at the time of the visit. [Medical Office: ([facility])___] : at the medical office located in  [Patient] : the patient [Self] : self [Follow-Up Visit] : a follow-up visit [FreeTextEntry1] : depression and anxiety

## 2022-12-09 NOTE — BEHAVIORAL HEALTH
[Cognitive and/or Behavior Therapy] : Cognitive and/or Behavior Therapy  [[facility]] : [facility]  [Recommended Frequency of Visits: ____] : Recommended frequency of visits: [unfilled] [Return in ____ week(s)] : Return in [unfilled] week(s) [FreeTextEntry2] : 1. IMproved mood and self esteem. \par 2. IMproved anxiety and stress management. \par 3.Reduced anger and improved ability to accept losses.  [de-identified] : Session conducted via telehealth/teladoc with verbal consent of patient lasting 53 minutes. Treatment provided by Travis Godoy LCSW. Patient reports that she was recovering well from surgery but recently had COVID. She stated that she is now recovered from that as is . She spoke about going to a family wedding and doing well dealing with people's questions and she is feeling more comfortable in general. She stated that she had one panic attack when she saw her brother in a restaurant. She spoke about plans for another surgery next month  related to gastric paresis. She was able to have short vacation to [place] and [place] and has more plans for April and May to travel again. Patient doing art class on line that she is enjoying. Continued to work with patient on identifying positive things in her life and staying busy with social and creative pursuits and accepting losses.  [A. Has patient wished to be dead or wished to go to sleep and not wake up?] : Has patient wished to be dead or wished to go to sleep and not wake up? No [B. Has paitient had any thoughts of killing self?] : Has patient had any thoughts of killing self? No

## 2023-01-23 ENCOUNTER — APPOINTMENT (OUTPATIENT)
Dept: PSYCHIATRY | Facility: CLINIC | Age: 58
End: 2023-01-23
Payer: MEDICARE

## 2023-01-23 PROCEDURE — 90837 PSYTX W PT 60 MINUTES: CPT | Mod: 95

## 2023-01-24 NOTE — BEHAVIORAL HEALTH
[Cognitive and/or Behavior Therapy] : Cognitive and/or Behavior Therapy  [[facility]] : [facility]  [Recommended Frequency of Visits: ____] : Recommended frequency of visits: [unfilled] [Return in ____ week(s)] : Return in [unfilled] week(s) [FreeTextEntry2] : 1. IMproved mood and self esteem. \par 2. IMproved anxiety and stress management. \par 3.Reduced anger and improved ability to accept losses.  [de-identified] : Session conducted via telehealth/[employer] with verbal consent of patient lasting 53 minutes. Treatment provided by Travis Godoy LCSW. Patient reports that she has had surgery for gastric paresis and is recovering well. She has no pain at this time but is still eating softer foods and smaller amounts. She is less tired and mood is mostly good with some anxiety that is focused on her middle son who is likely depressed but rejecting help for this. She states that her other two kids are doing every well and she is not sure how to help son. She spoke about seeing pictures of her father's trip to [place] where he took her siblings and their children and this triggered some depressed mood for her and her kids and she spoke about how this affected their time together over the holidays.       Continued to work with patient on identifying positive things in her life and staying busy with social and creative pursuits and accepting losses.  [A. Has patient wished to be dead or wished to go to sleep and not wake up?] : Has patient wished to be dead or wished to go to sleep and not wake up? No [B. Has paitient had any thoughts of killing self?] : Has patient had any thoughts of killing self? No

## 2023-01-24 NOTE — REASON FOR VISIT
[Patient preference] : as per patient preference [Continuing, patient seen in-person within last 12 months] : Telehealth services are continuing as patient has been seen in-person within last 12 months. [Telehealth (audio & video) - Individual/Group] : This visit was provided via telehealth using real-time 2-way audio visual technology. [Verbal consent obtained from patient/other participant(s)] : Verbal consent for telehealth/telephonic services obtained from patient/other participant(s) [Duration of Psychotherapy Visit (minutes spent in synchronous communication): ____] : Duration of Psychotherapy Visit (minutes spent in synchronous communication): [unfilled] [Individual Psychotherapy for 53+ minutes] : Individual Psychotherapy for 53+ minutes  [Teletherapy Service Provided] : The services provided in this session were delivered via tele-therapy [Home] : at home, [unfilled] , at the time of the visit. [Medical Office: ([facility])___] : at the medical office located in  [Patient] : the patient [Self] : self [Follow-Up Visit] : a follow-up visit [FreeTextEntry4] : 2PM [FreeTextEntry5] : 2:53PM [FreeTextEntry2] : 5/25/22 [FreeTextEntry1] : depression and anxiety

## 2023-02-21 ENCOUNTER — APPOINTMENT (OUTPATIENT)
Dept: PSYCHIATRY | Facility: CLINIC | Age: 58
End: 2023-02-21
Payer: MEDICARE

## 2023-02-21 PROCEDURE — 90837 PSYTX W PT 60 MINUTES: CPT | Mod: 95

## 2023-02-22 NOTE — BEHAVIORAL HEALTH
[Cognitive and/or Behavior Therapy] : Cognitive and/or Behavior Therapy  [[facility]] : [facility]  [Recommended Frequency of Visits: ____] : Recommended frequency of visits: [unfilled] [Return in ____ week(s)] : Return in [unfilled] week(s) [FreeTextEntry2] : 1. IMproved mood and self esteem. \par 2. IMproved anxiety and stress management. \par 3.Reduced anger and improved ability to accept losses.  [de-identified] : Session conducted via telehealth/teladoc with verbal consent of patient lasting 53 minutes. Treatment provided by Travis Godoy LCSW. Patient reports that she is doing well overall with manageable anxiety level at this time. She has had some GI issues the past few days but progressing well from surgery. She spoke about her younger son who has been struggling with depression and recently had COVID and had some suicidal ideation while she was away on vacation. She still struggles with feelings of helplessness regarding how to help him and we spoke about things she can do to support him the best she can. Continued to work with patient on identifying positive things in her life and staying busy with social and creative pursuits and accepting losses.  [A. Has patient wished to be dead or wished to go to sleep and not wake up?] : Has patient wished to be dead or wished to go to sleep and not wake up? No [B. Has paitient had any thoughts of killing self?] : Has patient had any thoughts of killing self? No

## 2023-02-22 NOTE — REASON FOR VISIT
[Patient preference] : as per patient preference [Continuing, patient seen in-person within last 12 months] : Telehealth services are continuing as patient has been seen in-person within last 12 months. [Telehealth (audio & video) - Individual/Group] : This visit was provided via telehealth using real-time 2-way audio visual technology. [Verbal consent obtained from patient/other participant(s)] : Verbal consent for telehealth/telephonic services obtained from patient/other participant(s) [Duration of Psychotherapy Visit (minutes spent in synchronous communication): ____] : Duration of Psychotherapy Visit (minutes spent in synchronous communication): [unfilled] [Individual Psychotherapy for 53+ minutes] : Individual Psychotherapy for 53+ minutes  [Teletherapy Service Provided] : The services provided in this session were delivered via tele-therapy [Home] : at home, [unfilled] , at the time of the visit. [Medical Office: ([facility])___] : at the medical office located in  [Patient] : the patient [Self] : self [Follow-Up Visit] : a follow-up visit [FreeTextEntry4] : 2PM [FreeTextEntry5] : 2:53PM [FreeTextEntry2] : 5/25/22 [FreeTextEntry1] : depression and anxiety

## 2023-02-22 NOTE — PHYSICAL EXAM
[Cooperative] : cooperative [Euthymic] : euthymic [Anxious] : anxious [Full] : full [Clear] : clear [Linear/Goal Directed] : linear/goal directed [Average] : average [WNL] : within normal limits

## 2023-03-20 ENCOUNTER — APPOINTMENT (OUTPATIENT)
Dept: PSYCHIATRY | Facility: CLINIC | Age: 58
End: 2023-03-20
Payer: MEDICARE

## 2023-03-20 PROCEDURE — 90837 PSYTX W PT 60 MINUTES: CPT | Mod: 95

## 2023-03-21 NOTE — BEHAVIORAL HEALTH
[Cognitive and/or Behavior Therapy] : Cognitive and/or Behavior Therapy  [[facility]] : [facility]  [Recommended Frequency of Visits: ____] : Recommended frequency of visits: [unfilled] [Return in ____ week(s)] : Return in [unfilled] week(s) [FreeTextEntry2] : 1. IMproved mood and self esteem. \par 2. IMproved anxiety and stress management. \par 3.Reduced anger and improved ability to accept losses.  [de-identified] : Session conducted via telehealth/[employer] with verbal consent of patient lasting 53 minutes. Treatment provided by Travis Godoy LCSW. Patient reports that she is doing better physically and can now eat normally for the first time in a couple years. She states that she has been a little more withdrawn and spending more time on her own. She states that this is related to disappointment in some of her friends who have not been responding to her reaching out to them. She states that she still has a couple long term friends and 's family but she is not interested in trying to make new friends. She is doing art classes on line and is enjoying these and plans to play golf and Bocce ball again in the spring. She still struggles with not being able to fix things for her kids and remains worried about her son who is depressed. Continued to work with patient on identifying positive things in her life and staying busy with social and creative pursuits and accepting losses.  [A. Has patient wished to be dead or wished to go to sleep and not wake up?] : Has patient wished to be dead or wished to go to sleep and not wake up? No [B. Has paitient had any thoughts of killing self?] : Has patient had any thoughts of killing self? No

## 2023-03-21 NOTE — REASON FOR VISIT
[Patient preference] : as per patient preference [Continuing, patient seen in-person within last 12 months] : Telehealth services are continuing as patient has been seen in-person within last 12 months. [Telehealth (audio & video) - Individual/Group] : This visit was provided via telehealth using real-time 2-way audio visual technology. [Verbal consent obtained from patient/other participant(s)] : Verbal consent for telehealth/telephonic services obtained from patient/other participant(s) [Duration of Psychotherapy Visit (minutes spent in synchronous communication): ____] : Duration of Psychotherapy Visit (minutes spent in synchronous communication): [unfilled] [Individual Psychotherapy for 53+ minutes] : Individual Psychotherapy for 53+ minutes  [Teletherapy Service Provided] : The services provided in this session were delivered via tele-therapy [Home] : at home, [unfilled] , at the time of the visit. [Medical Office: ([facility])___] : at the medical office located in  [Patient] : the patient [Self] : self [Follow-Up Visit] : a follow-up visit [FreeTextEntry4] : 2PM [FreeTextEntry5] : 2:53PM [FreeTextEntry2] : 5/25/22 [FreeTextEntry1] : depression and anxiety

## 2023-04-07 ENCOUNTER — APPOINTMENT (OUTPATIENT)
Dept: PSYCHIATRY | Facility: CLINIC | Age: 58
End: 2023-04-07
Payer: MEDICARE

## 2023-04-07 PROCEDURE — 99214 OFFICE O/P EST MOD 30 MIN: CPT

## 2023-04-07 NOTE — HISTORY OF PRESENT ILLNESS
[de-identified] : \par Patient is here in the office for face to face interview with writer for follow-up visit.\par \par Last time writer saw patient was May 2022. \par \par No medication changes 3 months ago. Mood is stable on Zoloft 200 mg. Denies any depression and anxiety.  Sleeping 7-8 hrs per night.  Denies any problems with appetite. Going in for gastric emptying surgery. Despite all of this she states the Zoloft is helping her tremendously. Energy, concentration and motivation have all improved. Patient denies any AVH, SI or HI. Sees Travis Godoy [Home] : at home, [unfilled] , at the time of the visit. [Medical Office: ([facility])___] : at the medical office located in  [Verbal consent obtained from patient] : the patient, [unfilled]

## 2023-04-07 NOTE — PHYSICAL EXAM
[None] : none [AH] : no auditory hallucinations [VH] : no visual hallucinations [Suicidal Ideation] : no suicidal ideation [Homicidal Ideation] : no homicidal ideation [Anxious] : anxious [Appropriate] : appropriate [Euphoric] : not euphoric [Constricted] : not constricted [Normal] : normal [Fair] : fair [FreeTextEntry7] : passive suicidal ideations but denies any plan [FreeTextEntry8] : "I feel better." [FreeTextEntry9] : better

## 2023-04-07 NOTE — CURRENT PSYCHIATRIC SYMPTOMS
[Depressed Mood] : depressed mood [Anhedonia] : anhedonia [Guilt] : feeling guilty [Decreased Concentration] : decreased concentrating ability [Insomnia] : insomnia [Psychomotor Agitation] : psychomotor agitation [Euphoria] : no euphoria [Grandeur] : no feelings of grandeur [Delusions] : no ~T delusions [Excessive Worry] : excessive worry [Ruminations] : ruminations [Re-experiencing] : re-experiencing [Hypochondriasis] : no hypochondriasis [Panic] : no panic disorder [Recent Onset] : no recent onset of confusion [Tremor] : ~T no ~M tremor was seen [Yawning] : no yawning [de-identified] : better [de-identified] : better

## 2023-04-07 NOTE — PAST MEDICAL HISTORY
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 She has no \par past history of any psychiatric inpatient admissions, no past history of any suicidal attempt or gestures, no past \par history of violent behavior , no history of substance abuse or alcohol use , that came for the first time to the clinic, \par referred to us by [facility] psychiatric service , for worsening depressive symptoms , in the setting of having two \par strokes three weeks ago.The patient has a history of depressed mood for the last 3 years, however she noticed that it \par began to affect her life approximately 6 months ago , after her  was imprisoned. She noticed that at that time \par her mood was very sad, every day of the week and all through the day, being worse in the morning and better in the \par afternoons.  She also noticed a significant decrease in her usual level of energy and she noticed that she was much \par slower than usual. She dropped 40 pounds as she had no appetite, she also had difficulty  falling and staying asleep , \par but no early morning awakenings. At this point however she was still able to focus and she was able to maintain her \par usual  every day to day activities , including work.Three weeks ago though, she woke up one morning and was not \par able to see the left side of her visual  field and she had left sided weakness. She was admitted to the hospital and \par diagnosed with having two different strokes. A few days after being diagnosed with the strokes, her depressive \par symptoms began to worsen in intensity and scope. She was very tearful, very sad, developed feelings of \par hopelessness, worthlessness, helplessness about her situation ,feelings of guilt and punishment, thinking she most \par have done something wrong to deserve her current situation,  suicidal ideation that was passive in quality, such as \par wishing she did not wake up ,  difficulty focusing and concentrating that manifested as difficulty reading the paper or \par watching TV , worsening in her sleep , with an average of 7 hours of sleep , but intermittent insomnia and daytime \par tiredness and  intense fatigue.  To treat these symptoms , the patient was started on Zoloft 50 mg  by the psychiatric \par service at the hospital . On today's interview the patient's symptoms have mildly decreased in intensity , such as not \par crying so much and having a decrease in hopelessness and helplessness. The patient continues to have passive \par suicidal ideation , such as wishing she would not wake up again, however, she now considers her children and thinks \par about them as her main reason for living. She also feels she could never attempt anything against her life because it \par would harm her children. She is not Zoroastrian , does not have any guns at home and is currently not having any active \par suicidal thought, intent or plan. Is future oriented to certain extent and is hopeful that the medication may help her \par get better. All of the other mentioned depressive symptoms remain unchanged. On the ROS the patient did not have any psychotic symptoms, such as paranoia, ideas of reference , or auditory or \par visual hallucinations. She does not have a history of manic symptoms such as grandiosity , pressured speech , flight \par of ideas , decrease in the need for sleep or history of elevated/irritable mood. She also does not have a history of any \par eating disorder, o/c symptoms or ptsd such as nightmares, or flashbacks about her recent medical or psychosocial \par stressors. Also the patient did not had any language, mediate and long term memory sequelae after the stroke.  \par Neurocognitive function appears grossly intact. Pertinent positive symptoms and associated to her depressive \par symptoms is an anxious mood , constantly worrying about different aspects of her life, including work, family and \par friends. These concerns remain unchanged. She also has a history of panic attacks , presently she does not have any.  \par Last one was 3 weeks ago when she had an MRI done in the hospital . She also is scared of being in  closed spaces.

## 2023-04-07 NOTE — ASSESSMENT
[FreeTextEntry1] : Patient is a 56 yo  female with h/o depression and anxiety seen today for medication management. Patient is compliant with medications tolerating them well without any side effects. I-stop reviewed and no issues noticed.\par \par PLAN:\par Continue Sertraline 200 mg PO QAM for depression and anxiety, PTSD. 90 day supply was sent\par D/C Wellbutrin 75 mg PO QAM for depression, low motivation, energy, concentration and decrease SSRI induced sexual dysfunction.\par D/C Melatonin \par Discussed risks and benefits of medications including side effects of GI and sexual side effects with SSRI. Alternative strategies including no intervention discussed with patient. Patient consents to current medications as prescribed.\par - Discussed with patient regarding importance of abstinence and sobriety from alcohol and drugs. Educated about relationship between worsening mood/anxiety symptoms and drug use and improvement of symptoms with abstinence. \par - Discussed about unpredictable effects including cardiorespiratory collapse from the combination of illicit drugs and prescribed medications. Patient verbalized understanding.\par - Patient understands to contact clinic prn with concerns and agrees to call 911 or go to nearest ER if symptoms worsen.\par - Next appointment made in 6 mth. Patient left the office without any distress.\par  \par

## 2023-04-07 NOTE — SOCIAL HISTORY
[With Family] : lives with family [Disabled] : disabled [] :  [# Of Children ___] : has [unfilled] children [High School] : high school [FreeTextEntry5] : denies  [FreeTextEntry1] :  was jailed for 3 years

## 2023-04-14 ENCOUNTER — APPOINTMENT (OUTPATIENT)
Dept: PSYCHIATRY | Facility: CLINIC | Age: 58
End: 2023-04-14

## 2023-08-16 ENCOUNTER — APPOINTMENT (OUTPATIENT)
Dept: PSYCHIATRY | Facility: CLINIC | Age: 58
End: 2023-08-16
Payer: MEDICARE

## 2023-08-16 PROCEDURE — 90837 PSYTX W PT 60 MINUTES: CPT | Mod: 95

## 2023-08-17 NOTE — BEHAVIORAL HEALTH
[FreeTextEntry2] : 1. IMproved mood and self esteem. \par  2. IMproved anxiety and stress management. \par  3.Reduced anger and improved ability to accept losses.  [Cognitive and/or Behavior Therapy] : Cognitive and/or Behavior Therapy  [[facility]] : [facility]  [de-identified] : Session conducted via telehealth/[employer] with verbal consent of patient lasting 53 minutes. Treatment provided by Travis Godoy LCSW. Patient reports that mood and anxiety level are improved. She is doing better physically and can now eat normally and been very active with golf and Pilates and other activities. She spoke about having her daughter around for the summer and they still butt heads, but she is better at not getting depressed about this. She states that middle son was severely depressed but has now left his job and working with father and doing much better. Patient not as focused on her family of origin and feels that depression is much better and will discuss possibly lowering medication when she speaks to doctor next. Continued to work with patient on identifying positive things in her life and staying busy with social and creative pursuits and accepting losses.  [Recommended Frequency of Visits: ____] : Recommended frequency of visits: [unfilled] [Return in ____ week(s)] : Return in [unfilled] week(s) [A. Has patient wished to be dead or wished to go to sleep and not wake up?] : Has patient wished to be dead or wished to go to sleep and not wake up? No [B. Has paitient had any thoughts of killing self?] : Has patient had any thoughts of killing self? No

## 2023-08-17 NOTE — REASON FOR VISIT
[Patient preference] : as per patient preference [Continuing, patient seen in-person within last 12 months] : Telehealth services are continuing as patient has been seen in-person within last 12 months. [Telehealth (audio & video) - Individual/Group] : This visit was provided via telehealth using real-time 2-way audio visual technology. [Verbal consent obtained from patient/other participant(s)] : Verbal consent for telehealth/telephonic services obtained from patient/other participant(s) [FreeTextEntry4] : 4 PM [FreeTextEntry5] : 4:53 PM [FreeTextEntry2] : 5/25/22 [Duration of Psychotherapy Visit (minutes spent in synchronous communication): ____] : Duration of Psychotherapy Visit (minutes spent in synchronous communication): [unfilled] [Individual Psychotherapy for 53+ minutes] : Individual Psychotherapy for 53+ minutes  [Teletherapy Service Provided] : The services provided in this session were delivered via tele-therapy [Home] : at home, [unfilled] , at the time of the visit. [Medical Office: ([facility])___] : at the medical office located in  [Patient] : the patient [Self] : self [Follow-Up Visit] : a follow-up visit [FreeTextEntry1] : depression and anxiety

## 2023-10-06 ENCOUNTER — APPOINTMENT (OUTPATIENT)
Dept: PSYCHIATRY | Facility: CLINIC | Age: 58
End: 2023-10-06
Payer: COMMERCIAL

## 2023-10-06 PROCEDURE — 99214 OFFICE O/P EST MOD 30 MIN: CPT

## 2023-10-06 PROCEDURE — 90834 PSYTX W PT 45 MINUTES: CPT

## 2023-11-06 ENCOUNTER — APPOINTMENT (OUTPATIENT)
Dept: PSYCHIATRY | Facility: CLINIC | Age: 58
End: 2023-11-06
Payer: MEDICARE

## 2023-11-06 PROCEDURE — 90837 PSYTX W PT 60 MINUTES: CPT | Mod: GT

## 2023-12-04 ENCOUNTER — APPOINTMENT (OUTPATIENT)
Dept: PSYCHIATRY | Facility: CLINIC | Age: 58
End: 2023-12-04
Payer: COMMERCIAL

## 2023-12-04 PROCEDURE — 90837 PSYTX W PT 60 MINUTES: CPT | Mod: GT

## 2024-01-08 ENCOUNTER — APPOINTMENT (OUTPATIENT)
Dept: PSYCHIATRY | Facility: CLINIC | Age: 59
End: 2024-01-08
Payer: MEDICARE

## 2024-01-08 PROCEDURE — 90837 PSYTX W PT 60 MINUTES: CPT | Mod: 95

## 2024-01-08 PROCEDURE — 99214 OFFICE O/P EST MOD 30 MIN: CPT | Mod: 95

## 2024-01-08 NOTE — PAST MEDICAL HISTORY
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 She has no \par  past history of any psychiatric inpatient admissions, no past history of any suicidal attempt or gestures, no past \par  history of violent behavior , no history of substance abuse or alcohol use , that came for the first time to the clinic, \par  referred to us by [facility] psychiatric service , for worsening depressive symptoms , in the setting of having two \par  strokes three weeks ago.The patient has a history of depressed mood for the last 3 years, however she noticed that it \par  began to affect her life approximately 6 months ago , after her  was imprisoned. She noticed that at that time \par  her mood was very sad, every day of the week and all through the day, being worse in the morning and better in the \par  afternoons.  She also noticed a significant decrease in her usual level of energy and she noticed that she was much \par  slower than usual. She dropped 40 pounds as she had no appetite, she also had difficulty  falling and staying asleep , \par  but no early morning awakenings. At this point however she was still able to focus and she was able to maintain her \par  usual  every day to day activities , including work.Three weeks ago though, she woke up one morning and was not \par  able to see the left side of her visual  field and she had left sided weakness. She was admitted to the hospital and \par  diagnosed with having two different strokes. A few days after being diagnosed with the strokes, her depressive \par  symptoms began to worsen in intensity and scope. She was very tearful, very sad, developed feelings of \par  hopelessness, worthlessness, helplessness about her situation ,feelings of guilt and punishment, thinking she most \par  have done something wrong to deserve her current situation,  suicidal ideation that was passive in quality, such as \par  wishing she did not wake up ,  difficulty focusing and concentrating that manifested as difficulty reading the paper or \par  watching TV , worsening in her sleep , with an average of 7 hours of sleep , but intermittent insomnia and daytime \par  tiredness and  intense fatigue.  To treat these symptoms , the patient was started on Zoloft 50 mg  by the psychiatric \par  service at the hospital . On today's interview the patient's symptoms have mildly decreased in intensity , such as not \par  crying so much and having a decrease in hopelessness and helplessness. The patient continues to have passive \par  suicidal ideation , such as wishing she would not wake up again, however, she now considers her children and thinks \par  about them as her main reason for living. She also feels she could never attempt anything against her life because it \par  would harm her children. She is not Nondenominational , does not have any guns at home and is currently not having any active \par  suicidal thought, intent or plan. Is future oriented to certain extent and is hopeful that the medication may help her \par  get better. All of the other mentioned depressive symptoms remain unchanged. On the ROS the patient did not have any psychotic symptoms, such as paranoia, ideas of reference , or auditory or \par  visual hallucinations. She does not have a history of manic symptoms such as grandiosity , pressured speech , flight \par  of ideas , decrease in the need for sleep or history of elevated/irritable mood. She also does not have a history of any \par  eating disorder, o/c symptoms or ptsd such as nightmares, or flashbacks about her recent medical or psychosocial \par  stressors. Also the patient did not had any language, mediate and long term memory sequelae after the stroke.  \par  Neurocognitive function appears grossly intact. Pertinent positive symptoms and associated to her depressive \par  symptoms is an anxious mood , constantly worrying about different aspects of her life, including work, family and \par  friends. These concerns remain unchanged. She also has a history of panic attacks , presently she does not have any.  \par  Last one was 3 weeks ago when she had an MRI done in the hospital . She also is scared of being in  closed spaces.

## 2024-01-08 NOTE — ASSESSMENT
[FreeTextEntry1] : Patient is a 56 yo  female with h/o depression and anxiety seen today for medication management. Patient is compliant with medications tolerating them well without any side effects. I-stop reviewed and no issues noticed.  PLAN: Decrease Sertraline 100 to 50 mg PO QAM for depression and anxiety, PTSD. 90 day supply was sent D/C Wellbutrin 75 mg PO QAM for depression, low motivation, energy, concentration and decrease SSRI induced sexual dysfunction. D/C Melatonin  Discussed risks and benefits of medications including side effects of GI and sexual side effects with SSRI. Alternative strategies including no intervention discussed with patient. Patient consents to current medications as prescribed. - Discussed with patient regarding importance of abstinence and sobriety from alcohol and drugs. Educated about relationship between worsening mood/anxiety symptoms and drug use and improvement of symptoms with abstinence.  - Discussed about unpredictable effects including cardiorespiratory collapse from the combination of illicit drugs and prescribed medications. Patient verbalized understanding. - Patient understands to contact clinic prn with concerns and agrees to call 911 or go to nearest ER if symptoms worsen. - Next appointment made in 3 months. Patient was not in any distress.

## 2024-01-08 NOTE — CURRENT PSYCHIATRIC SYMPTOMS
[Depressed Mood] : depressed mood [Anhedonia] : anhedonia [Guilt] : feeling guilty [Decreased Concentration] : decreased concentrating ability [Insomnia] : insomnia [Psychomotor Agitation] : psychomotor agitation [Euphoria] : no euphoria [Grandeur] : no feelings of grandeur [Delusions] : no ~T delusions [Excessive Worry] : excessive worry [Ruminations] : ruminations [Re-experiencing] : re-experiencing [Hypochondriasis] : no hypochondriasis [Panic] : no panic disorder [Recent Onset] : no recent onset of confusion [Tremor] : ~T no ~M tremor was seen [Yawning] : no yawning [de-identified] : better [de-identified] : better

## 2024-01-08 NOTE — HISTORY OF PRESENT ILLNESS
[de-identified] : The following service was provided using telehealth between writer/provider and patient. The patient was at home. The writer was at clinic. Patient was alone and consented to telehealth format. All treatment plans through and including today's date were reviewed with the patient.  Patient is here for 3 month follow-up visit via telehealth.  At that time Zoloft 200 was decreased to 100 mg as she has been on the medication since 2014.  Mood: Denies any depression and anxiety.   Sleeping 7-8 hrs per night.   Appetite: She has to talk to her GI. States she has some problems with the surgery but states she is eating.  Energy, concentration and motivation have all improved. Patient denies any AVH, SI or HI. Sees Travis Godoy [Home] : at home, [unfilled] , at the time of the visit. [Medical Office: ([facility])___] : at the medical office located in  [Verbal consent obtained from patient] : the patient, [unfilled]

## 2024-02-05 ENCOUNTER — APPOINTMENT (OUTPATIENT)
Dept: PSYCHIATRY | Facility: CLINIC | Age: 59
End: 2024-02-05
Payer: MEDICARE

## 2024-02-05 PROCEDURE — 90837 PSYTX W PT 60 MINUTES: CPT | Mod: 95

## 2024-02-06 NOTE — BEHAVIORAL HEALTH
[FreeTextEntry2] : 1. IMproved mood and self esteem. \par  2. IMproved anxiety and stress management. \par  3.Reduced anger and improved ability to accept losses.  [Cognitive and/or Behavior Therapy] : Cognitive and/or Behavior Therapy  [[facility]] : [facility]  [de-identified] : Session conducted via [employer] with consent of patient lasting 53 minutes. Treatment provided by Travis Godoy LCSW. She reports that mood and anxiety level are variable. She states that she has lowered dose of anti-depressant and had one brief period of increased depression. She spoke about the trigger for this relating to disappointment in a close friend taking her for granted. We spoke about ways of coping with this situation and shifting focus to other things she is doing. Patient also still worried about her younger son who still does not have a steady career and she has some differences with  over how to handle this. She states that she still has been having trouble eating again and will follow up with her physician. Continued to work with patient on identifying positive things in her life and staying busy with social and creative pursuits and accepting losses.  [Recommended Frequency of Visits: ____] : Recommended frequency of visits: [unfilled] [Return in ____ week(s)] : Return in [unfilled] week(s) [A. Has patient wished to be dead or wished to go to sleep and not wake up?] : Has patient wished to be dead or wished to go to sleep and not wake up? No [B. Has paitient had any thoughts of killing self?] : Has patient had any thoughts of killing self? No

## 2024-02-06 NOTE — REASON FOR VISIT
[Patient preference] : as per patient preference [Starting, patient seen in-person within last 6 months] : Telehealth services are being started as patient has seen in-person within last 6 months. [Telehealth (audio & video) - Individual/Group] : This visit was provided via telehealth using real-time 2-way audio visual technology. [Other Location: e.g. Home (Enter Location, City,State)___] : The provider was located at [unfilled]. [Home] : The patient, [unfilled], was located at home, [unfilled], at the time of the visit. [Verbal consent obtained from patient/other participant(s)] : Verbal consent for telehealth/telephonic services obtained from patient/other participant(s) [FreeTextEntry4] : 1 PM [FreeTextEntry5] : 1:53 PM [Patient] : patient, [unfilled] is a ~age~ year old ~male/female~ being seen for a follow-up visit  [Duration of Psychotherapy Visit (minutes spent in synchronous communication): ____] : Duration of Psychotherapy Visit (minutes spent in synchronous communication): [unfilled] [Individual Psychotherapy for 53+ minutes] : Individual Psychotherapy for 53+ minutes  [Teletherapy Service Provided] : The services provided in this session were delivered via tele-therapy [Follow-Up Visit] : a follow-up visit [FreeTextEntry1] : depression and anxiety

## 2024-03-18 ENCOUNTER — APPOINTMENT (OUTPATIENT)
Dept: PSYCHIATRY | Facility: CLINIC | Age: 59
End: 2024-03-18
Payer: MEDICARE

## 2024-03-18 PROCEDURE — 90837 PSYTX W PT 60 MINUTES: CPT | Mod: 95

## 2024-03-19 NOTE — REASON FOR VISIT
[Patient preference] : as per patient preference [Starting, patient seen in-person within last 6 months] : Telehealth services are being started as patient has seen in-person within last 6 months. [Telehealth (audio & video) - Individual/Group] : This visit was provided via telehealth using real-time 2-way audio visual technology. [Other Location: e.g. Home (Enter Location, City,State)___] : The provider was located at [unfilled]. [Home] : The patient, [unfilled], was located at home, [unfilled], at the time of the visit. [Verbal consent obtained from patient/other participant(s)] : Verbal consent for telehealth/telephonic services obtained from patient/other participant(s) [FreeTextEntry4] : 1 PM [FreeTextEntry5] : 1:53 PM [Patient] : patient, [unfilled] is a ~age~ year old ~male/female~ being seen for a follow-up visit  [Individual Psychotherapy for 53+ minutes] : Individual Psychotherapy for 53+ minutes  [Duration of Psychotherapy Visit (minutes spent in synchronous communication): ____] : Duration of Psychotherapy Visit (minutes spent in synchronous communication): [unfilled] [Teletherapy Service Provided] : The services provided in this session were delivered via tele-therapy [Follow-Up Visit] : a follow-up visit [FreeTextEntry1] : depression and anxiety

## 2024-03-19 NOTE — BEHAVIORAL HEALTH
[FreeTextEntry2] : 1. IMproved mood and self esteem. \par  2. IMproved anxiety and stress management. \par  3.Reduced anger and improved ability to accept losses.  [Cognitive and/or Behavior Therapy] : Cognitive and/or Behavior Therapy  [[place] [facility]] : [place] [facility]  [de-identified] : Session conducted via [employer] with consent of patient lasting 53 minutes. Treatment provided by Travis Godoy LCSW. Patient reports that mood and anxiety level are variable. She states that she has had some anxiety about some increased physical pain she is dealing with and worries about next serious illness that she may have. She spoke about good trip to [place] with . She stated that daughter joined then for part of the time and she was unkind to her. We spoke more about ways of coping with her as she still carries resentment of parents for having to go through father's criminal case and mother's illnesses.  Patient also still worried about her younger son who still does not have a steady career and she has some differences with  over how to handle this. She spoke about her upcoming 59th birthday and lack of close friends and is still grieving for these losses. Continued to work with patient on identifying positive things in her life and staying busy with social and creative pursuits and accepting losses.  [Recommended Frequency of Visits: ____] : Recommended frequency of visits: [unfilled] [Return in ____ week(s)] : Return in [unfilled] week(s) [A. Has patient wished to be dead or wished to go to sleep and not wake up?] : Has patient wished to be dead or wished to go to sleep and not wake up? No [B. Has paitient had any thoughts of killing self?] : Has patient had any thoughts of killing self? No

## 2024-03-21 ENCOUNTER — APPOINTMENT (OUTPATIENT)
Dept: OBGYN | Facility: CLINIC | Age: 59
End: 2024-03-21
Payer: MEDICARE

## 2024-03-21 PROCEDURE — 96127 BRIEF EMOTIONAL/BEHAV ASSMT: CPT

## 2024-03-21 PROCEDURE — 99396 PREV VISIT EST AGE 40-64: CPT

## 2024-04-08 ENCOUNTER — APPOINTMENT (OUTPATIENT)
Dept: PSYCHIATRY | Facility: CLINIC | Age: 59
End: 2024-04-08
Payer: MEDICARE

## 2024-04-08 PROCEDURE — 99214 OFFICE O/P EST MOD 30 MIN: CPT | Mod: 95

## 2024-04-08 RX ORDER — SERTRALINE HYDROCHLORIDE 50 MG/1
50 TABLET, FILM COATED ORAL DAILY
Qty: 90 | Refills: 0 | Status: ACTIVE | COMMUNITY
Start: 2018-09-04 | End: 1900-01-01

## 2024-04-08 NOTE — CURRENT PSYCHIATRIC SYMPTOMS
[Depressed Mood] : depressed mood [Anhedonia] : anhedonia [Guilt] : feeling guilty [Decreased Concentration] : decreased concentrating ability [Insomnia] : insomnia [Psychomotor Agitation] : psychomotor agitation [Euphoria] : no euphoria [Grandeur] : no feelings of grandeur [Delusions] : no ~T delusions [Excessive Worry] : excessive worry [Ruminations] : ruminations [Re-experiencing] : re-experiencing [Hypochondriasis] : no hypochondriasis [Panic] : no panic disorder [Recent Onset] : no recent onset of confusion [Tremor] : ~T no ~M tremor was seen [Yawning] : no yawning [de-identified] : better [de-identified] : better

## 2024-04-08 NOTE — ASSESSMENT
[FreeTextEntry1] : Assessment: Patient is a 54 yo  female with h/o depression and anxiety seen today for medication management. Patient is compliant with medications tolerating them well without any side effects. I-stop reviewed, and no issues noticed.  PLAN: Continue Sertraline 50 mg PO QAM for depression and anxiety, PTSD. 90 day supply was sent D/C Wellbutrin 75 mg PO QAM for depression, low motivation, energy, concentration and decrease SSRI induced sexual dysfunction. D/C Melatonin  Discussed risks and benefits of medications including side effects of GI and sexual side effects with SSRI. Alternative strategies including no intervention discussed with patient. Patient consents to current medications as prescribed. - Discussed with patient regarding importance of abstinence and sobriety from alcohol and drugs. Educated about relationship between worsening mood/anxiety symptoms and drug use and improvement of symptoms with abstinence.  - Discussed about unpredictable effects including cardiorespiratory collapse from the combination of illicit drugs and prescribed medications. Patient verbalized understanding. - Patient understands to contact clinic prn with concerns and agrees to call 911 or go to nearest ER if symptoms worsen. - Next appointment made in 3 months. Patient was not in any distress.

## 2024-04-08 NOTE — PAST MEDICAL HISTORY
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 She has no \par  past history of any psychiatric inpatient admissions, no past history of any suicidal attempt or gestures, no past \par  history of violent behavior , no history of substance abuse or alcohol use , that came for the first time to the clinic, \par  referred to us by [facility] psychiatric service , for worsening depressive symptoms , in the setting of having two \par  strokes three weeks ago.The patient has a history of depressed mood for the last 3 years, however she noticed that it \par  began to affect her life approximately 6 months ago , after her  was imprisoned. She noticed that at that time \par  her mood was very sad, every day of the week and all through the day, being worse in the morning and better in the \par  afternoons.  She also noticed a significant decrease in her usual level of energy and she noticed that she was much \par  slower than usual. She dropped 40 pounds as she had no appetite, she also had difficulty  falling and staying asleep , \par  but no early morning awakenings. At this point however she was still able to focus and she was able to maintain her \par  usual  every day to day activities , including work.Three weeks ago though, she woke up one morning and was not \par  able to see the left side of her visual  field and she had left sided weakness. She was admitted to the hospital and \par  diagnosed with having two different strokes. A few days after being diagnosed with the strokes, her depressive \par  symptoms began to worsen in intensity and scope. She was very tearful, very sad, developed feelings of \par  hopelessness, worthlessness, helplessness about her situation ,feelings of guilt and punishment, thinking she most \par  have done something wrong to deserve her current situation,  suicidal ideation that was passive in quality, such as \par  wishing she did not wake up ,  difficulty focusing and concentrating that manifested as difficulty reading the paper or \par  watching TV , worsening in her sleep , with an average of 7 hours of sleep , but intermittent insomnia and daytime \par  tiredness and  intense fatigue.  To treat these symptoms , the patient was started on Zoloft 50 mg  by the psychiatric \par  service at the hospital . On today's interview the patient's symptoms have mildly decreased in intensity , such as not \par  crying so much and having a decrease in hopelessness and helplessness. The patient continues to have passive \par  suicidal ideation , such as wishing she would not wake up again, however, she now considers her children and thinks \par  about them as her main reason for living. She also feels she could never attempt anything against her life because it \par  would harm her children. She is not Methodist , does not have any guns at home and is currently not having any active \par  suicidal thought, intent or plan. Is future oriented to certain extent and is hopeful that the medication may help her \par  get better. All of the other mentioned depressive symptoms remain unchanged. On the ROS the patient did not have any psychotic symptoms, such as paranoia, ideas of reference , or auditory or \par  visual hallucinations. She does not have a history of manic symptoms such as grandiosity , pressured speech , flight \par  of ideas , decrease in the need for sleep or history of elevated/irritable mood. She also does not have a history of any \par  eating disorder, o/c symptoms or ptsd such as nightmares, or flashbacks about her recent medical or psychosocial \par  stressors. Also the patient did not had any language, mediate and long term memory sequelae after the stroke.  \par  Neurocognitive function appears grossly intact. Pertinent positive symptoms and associated to her depressive \par  symptoms is an anxious mood , constantly worrying about different aspects of her life, including work, family and \par  friends. These concerns remain unchanged. She also has a history of panic attacks , presently she does not have any.  \par  Last one was 3 weeks ago when she had an MRI done in the hospital . She also is scared of being in  closed spaces.

## 2024-04-08 NOTE — HISTORY OF PRESENT ILLNESS
[de-identified] : The following service was provided using telehealth between writer/provider and patient. The patient was at home. The writer was at clinic. Patient was alone and consented to telehealth format. All treatment plans through and including today's date were reviewed with the patient.  Patient is here for 3 month follow-up visit via telehealth.  At that time Zoloft 100 was decreased to 50 mg. States she likes the Zoloft 50 mg as it is helping her with the mood.  Mood: Denies any depression and anxiety.   Sleeping 7-8 hrs per night.   Appetite: Has an appt with her GI in 2 weeks and will speak to him about her GI symptoms.  Energy, concentration and motivation have all improved. Patient denies any AVH, SI or HI. Sees Travis Godoy [Home] : at home, [unfilled] , at the time of the visit. [Medical Office: ([facility])___] : at the medical office located in  [Verbal consent obtained from patient] : the patient, [unfilled]

## 2024-04-15 ENCOUNTER — APPOINTMENT (OUTPATIENT)
Dept: PSYCHIATRY | Facility: CLINIC | Age: 59
End: 2024-04-15
Payer: MEDICARE

## 2024-04-15 PROCEDURE — 90837 PSYTX W PT 60 MINUTES: CPT | Mod: 95

## 2024-04-16 NOTE — BEHAVIORAL HEALTH
[FreeTextEntry2] : 1. IMproved mood and self esteem. \par  2. IMproved anxiety and stress management. \par  3.Reduced anger and improved ability to accept losses.  [Cognitive and/or Behavior Therapy] : Cognitive and/or Behavior Therapy  [[place] [facility]] : [facility]  [de-identified] : Session conducted via [employer] with consent of patient lasting 53 minutes. Treatment provided by Travis Godoy LCSW. Patient reports that mood and anxiety level are better. She states that she still has some anxiety about some physical pain she is dealing with and worries about next serious illness that she may have. She has medical appointments scheduled for follow ups regarding gastric paresis. Patient reports that she and  have been able to do more things including travelling and golfing as in laws have transferred some of their assets to family which has made financial situation better. She spoke about her upcoming 59th birthday and lack of close friends and is still grieving for these losses. Continued to work with patient on identifying positive things in her life and staying busy with social and creative pursuits and accepting losses.  [Recommended Frequency of Visits: ____] : Recommended frequency of visits: [unfilled] [Return in ____ week(s)] : Return in [unfilled] week(s) [A. Has patient wished to be dead or wished to go to sleep and not wake up?] : Has patient wished to be dead or wished to go to sleep and not wake up? No [B. Has paitient had any thoughts of killing self?] : Has patient had any thoughts of killing self? No

## 2024-05-13 ENCOUNTER — APPOINTMENT (OUTPATIENT)
Dept: PSYCHIATRY | Facility: CLINIC | Age: 59
End: 2024-05-13
Payer: MEDICARE

## 2024-05-13 PROCEDURE — 90837 PSYTX W PT 60 MINUTES: CPT | Mod: 95

## 2024-05-14 NOTE — REASON FOR VISIT
[Patient preference] : as per patient preference [Continuing, patient seen in-person within last 12 months] : Telehealth services are continuing as patient has been seen in-person within last 12 months. [Telehealth (audio & video) - Individual/Group] : This visit was provided via telehealth using real-time 2-way audio visual technology. [Other Location: e.g. Home (Enter Location, City,State)___] : The provider was located at [unfilled]. [Home] : The patient, [unfilled], was located at home, [unfilled], at the time of the visit. [Verbal consent obtained from patient/other participant(s)] : Verbal consent for telehealth/telephonic services obtained from patient/other participant(s) [FreeTextEntry4] : 2 PM [FreeTextEntry5] : 2:53 PM [Patient] : patient, [unfilled] is a ~age~ year old ~male/female~ being seen for a follow-up visit  [Duration of Psychotherapy Visit (minutes spent in synchronous communication): ____] : Duration of Psychotherapy Visit (minutes spent in synchronous communication): [unfilled] [Individual Psychotherapy for 53+ minutes] : Individual Psychotherapy for 53+ minutes  [Teletherapy Service Provided] : The services provided in this session were delivered via tele-therapy [Follow-Up Visit] : a follow-up visit [FreeTextEntry1] : depression and anxiety

## 2024-05-14 NOTE — BEHAVIORAL HEALTH
[FreeTextEntry2] : 1. IMproved mood and self esteem. \par  2. IMproved anxiety and stress management. \par  3.Reduced anger and improved ability to accept losses.  [Cognitive and/or Behavior Therapy] : Cognitive and/or Behavior Therapy  [[place] [facility]] : [facility]  [de-identified] : Session conducted via [employer] with consent of patient lasting 53 minutes. Treatment provided by Travis Godoy LCSW. Patient reports that mood and anxiety level are worse as her 59th birthday approaches, She spoke about not having her family of origin or any close friends around to have a big party and also having ongoing medical issues that she is constantly dealing with. She states that she may have Meniere's Disease and they are also worried about her low heart rate.  We spoke about the plans she does have to attend daughter's law school graduation with all three of her children and ways of shifting the focus to the things she does have and not just those she does not. Patient reports that she and  have been travelling and golfing and she has been doing art and Pilates classes which she enjoys. Continued to work with patient on identifying positive things in her life and staying busy with social and creative pursuits and accepting losses.  [Recommended Frequency of Visits: ____] : Recommended frequency of visits: [unfilled] [Return in ____ week(s)] : Return in [unfilled] week(s) [A. Has patient wished to be dead or wished to go to sleep and not wake up?] : Has patient wished to be dead or wished to go to sleep and not wake up? No [B. Has paitient had any thoughts of killing self?] : Has patient had any thoughts of killing self? No

## 2024-06-17 ENCOUNTER — APPOINTMENT (OUTPATIENT)
Dept: PSYCHIATRY | Facility: CLINIC | Age: 59
End: 2024-06-17
Payer: MEDICARE

## 2024-06-17 DIAGNOSIS — F41.9 ANXIETY DISORDER, UNSPECIFIED: ICD-10-CM

## 2024-06-17 DIAGNOSIS — F32.A DEPRESSION, UNSPECIFIED: ICD-10-CM

## 2024-06-17 PROCEDURE — 90837 PSYTX W PT 60 MINUTES: CPT | Mod: 95

## 2024-06-18 PROBLEM — F32.A DEPRESSION: Status: ACTIVE | Noted: 2018-07-31

## 2024-06-18 NOTE — REASON FOR VISIT
[Patient preference] : as per patient preference [Continuing, patient seen in-person within last 12 months] : Telehealth services are continuing as patient has been seen in-person within last 12 months. [Telehealth (audio & video) - Individual/Group] : This visit was provided via telehealth using real-time 2-way audio visual technology. [Other Location: e.g. Home (Enter Location, City,State)___] : The provider was located at [unfilled]. [Home] : The patient, [unfilled], was located at home, [unfilled], at the time of the visit. [Verbal consent obtained from patient/other participant(s)] : Verbal consent for telehealth/telephonic services obtained from patient/other participant(s) [FreeTextEntry4] : 1 PM [FreeTextEntry5] : 1:53 PM [Patient] : patient, [unfilled] is a ~age~ year old ~male/female~ being seen for a follow-up visit  [Duration of Psychotherapy Visit (minutes spent in synchronous communication): ____] : Duration of Psychotherapy Visit (minutes spent in synchronous communication): [unfilled] [Individual Psychotherapy for 53+ minutes] : Individual Psychotherapy for 53+ minutes  [Teletherapy Service Provided] : The services provided in this session were delivered via tele-therapy [Follow-Up Visit] : a follow-up visit [FreeTextEntry1] : depression and anxiety

## 2024-06-18 NOTE — BEHAVIORAL HEALTH
[FreeTextEntry2] : 1. IMproved mood and self esteem. \par  2. IMproved anxiety and stress management. \par  3.Reduced anger and improved ability to accept losses.  [Cognitive and/or Behavior Therapy] : Cognitive and/or Behavior Therapy  [[place] [facility]] : [facility] [facility]  [de-identified] : Session conducted via [employer] with consent of patient lasting 53 minutes. Treatment provided by Travis Godoy LCSW. Patient reports that mood and anxiety level are better than last visit. She stated that she and  went to [place] for her 59th birthday and had a nice time. She is trying to focus less on the absence of her friends and family of origin and remains busy with healthy activities like golf and painting. Medical issues have been variable and she has an endoscopy coming up next week. Continued to work with patient on identifying positive things in her life and staying busy with social and creative pursuits and accepting losses.  [Recommended Frequency of Visits: ____] : Recommended frequency of visits: [unfilled] [Return in ____ week(s)] : Return in [unfilled] week(s) [B. Has paitient had any thoughts of killing self?] : Has patient had any thoughts of killing self? No [A. Has patient wished to be dead or wished to go to sleep and not wake up?] : Has patient wished to be dead or wished to go to sleep and not wake up? No

## 2024-07-16 ENCOUNTER — APPOINTMENT (OUTPATIENT)
Dept: PSYCHIATRY | Facility: CLINIC | Age: 59
End: 2024-07-16

## 2024-07-16 DIAGNOSIS — F41.9 ANXIETY DISORDER, UNSPECIFIED: ICD-10-CM

## 2024-07-16 DIAGNOSIS — M62.9 DISORDER OF MUSCLE, UNSPECIFIED: ICD-10-CM

## 2024-07-16 DIAGNOSIS — M24.20 DISORDER OF MUSCLE, UNSPECIFIED: ICD-10-CM

## 2024-07-16 PROCEDURE — 90834 PSYTX W PT 45 MINUTES: CPT | Mod: 95

## 2024-08-13 ENCOUNTER — APPOINTMENT (OUTPATIENT)
Dept: PSYCHIATRY | Facility: CLINIC | Age: 59
End: 2024-08-13

## 2024-09-05 ENCOUNTER — APPOINTMENT (OUTPATIENT)
Dept: PSYCHIATRY | Facility: CLINIC | Age: 59
End: 2024-09-05
Payer: MEDICARE

## 2024-09-05 DIAGNOSIS — F32.A DEPRESSION, UNSPECIFIED: ICD-10-CM

## 2024-09-05 DIAGNOSIS — F41.9 ANXIETY DISORDER, UNSPECIFIED: ICD-10-CM

## 2024-09-05 PROCEDURE — 90837 PSYTX W PT 60 MINUTES: CPT | Mod: 95

## 2024-09-06 NOTE — BEHAVIORAL HEALTH
[Cognitive and/or Behavior Therapy] : Cognitive and/or Behavior Therapy  [[place] [facility]] : [facility]  [Recommended Frequency of Visits: ____] : Recommended frequency of visits: [unfilled] [Return in ____ week(s)] : Return in [unfilled] week(s) [FreeTextEntry2] : 1. IMproved mood and self esteem. \par  2. IMproved anxiety and stress management. \par  3.Reduced anger and improved ability to accept losses.  [de-identified] : Session conducted via [email or web address] with consent of patient lasting 53 minutes. Treatment provided by Travis Godoy LCSW. Patient reports that mood and anxiety level have been variable. She states that she is suffering from pain related to sciatica which has been hard to mitigate. she travelled to [place] with  and daughter while she was in this pain, and it impacted how much she was able to enjoy it. She spoke about her health as one of the main triggers with daughter being nasty to her and how this impacted their trip as well. She has not been able to play golf which was a source of enjoyment for her. She is doing PT for the sciatica with minimal improvement to date. She spoke about visiting [facility] on her trip and not being able to talk about this with her family of origin was very hard for her. Continued to work with patient on identifying positive things in her life and staying busy with social and creative pursuits and accepting losses.  [A. Has patient wished to be dead or wished to go to sleep and not wake up?] : Has patient wished to be dead or wished to go to sleep and not wake up? No [B. Has paitient had any thoughts of killing self?] : Has patient had any thoughts of killing self? No

## 2024-09-06 NOTE — REASON FOR VISIT
[Patient preference] : as per patient preference [Continuing, patient not seen in-person within last 12 months (provide details below)] : Telehealth services are continuing, patient not seen in-person within last 12 months.  [Telehealth (audio & video) - Individual/Group] : This visit was provided via telehealth using real-time 2-way audio visual technology. [Other Location: e.g. Home (Enter Location, City,State)___] : The provider was located at [unfilled]. [Home] : The patient, [unfilled], was located at home, [unfilled], at the time of the visit. [Patient's space is appropriate for telehealth and maintains privacy/confidentiality.] : Patient's space is appropriate for telehealth and maintains privacy/confidentiality. [Verbal consent obtained from patient/other participant(s)] : Verbal consent for telehealth/telephonic services obtained from patient/other participant(s) [Patient] : patient, [unfilled] is a ~age~ year old ~male/female~ being seen for a follow-up visit  [Duration of Psychotherapy Visit (minutes spent in synchronous communication): ____] : Duration of Psychotherapy Visit (minutes spent in synchronous communication): [unfilled] [Individual Psychotherapy for 53+ minutes] : Individual Psychotherapy for 53+ minutes  [Teletherapy Service Provided] : The services provided in this session were delivered via tele-therapy [Follow-Up Visit] : a follow-up visit [FreeTextEntry4] : 3 PM [FreeTextEntry5] : 3:53 PM [FreeTextEntry1] : depression and anxiety

## 2024-10-07 ENCOUNTER — APPOINTMENT (OUTPATIENT)
Dept: PSYCHIATRY | Facility: CLINIC | Age: 59
End: 2024-10-07
Payer: MEDICARE

## 2024-10-07 DIAGNOSIS — F32.A DEPRESSION, UNSPECIFIED: ICD-10-CM

## 2024-10-07 DIAGNOSIS — F41.9 ANXIETY DISORDER, UNSPECIFIED: ICD-10-CM

## 2024-10-07 PROCEDURE — 90837 PSYTX W PT 60 MINUTES: CPT | Mod: 95

## 2024-11-04 ENCOUNTER — APPOINTMENT (OUTPATIENT)
Dept: PSYCHIATRY | Facility: CLINIC | Age: 59
End: 2024-11-04
Payer: MEDICARE

## 2024-11-04 DIAGNOSIS — F41.9 ANXIETY DISORDER, UNSPECIFIED: ICD-10-CM

## 2024-11-04 DIAGNOSIS — F32.A DEPRESSION, UNSPECIFIED: ICD-10-CM

## 2024-11-04 PROCEDURE — 90837 PSYTX W PT 60 MINUTES: CPT | Mod: 95

## 2024-12-02 ENCOUNTER — APPOINTMENT (OUTPATIENT)
Dept: PSYCHIATRY | Facility: CLINIC | Age: 59
End: 2024-12-02
Payer: MEDICARE

## 2024-12-02 DIAGNOSIS — F41.9 ANXIETY DISORDER, UNSPECIFIED: ICD-10-CM

## 2024-12-02 DIAGNOSIS — F32.A DEPRESSION, UNSPECIFIED: ICD-10-CM

## 2024-12-02 PROCEDURE — 90837 PSYTX W PT 60 MINUTES: CPT | Mod: 95

## 2025-01-13 ENCOUNTER — APPOINTMENT (OUTPATIENT)
Dept: PSYCHIATRY | Facility: CLINIC | Age: 60
End: 2025-01-13

## 2025-02-03 ENCOUNTER — APPOINTMENT (OUTPATIENT)
Dept: PSYCHIATRY | Facility: CLINIC | Age: 60
End: 2025-02-03
Payer: SELF-PAY

## 2025-02-03 DIAGNOSIS — F32.A DEPRESSION, UNSPECIFIED: ICD-10-CM

## 2025-02-03 DIAGNOSIS — F41.9 ANXIETY DISORDER, UNSPECIFIED: ICD-10-CM

## 2025-02-03 PROCEDURE — 90837 PSYTX W PT 60 MINUTES: CPT | Mod: 95

## 2025-03-10 ENCOUNTER — APPOINTMENT (OUTPATIENT)
Dept: PSYCHIATRY | Facility: CLINIC | Age: 60
End: 2025-03-10

## 2025-09-13 ENCOUNTER — NON-APPOINTMENT (OUTPATIENT)
Age: 60
End: 2025-09-13

## 2025-09-15 ENCOUNTER — NON-APPOINTMENT (OUTPATIENT)
Age: 60
End: 2025-09-15

## 2025-09-17 ENCOUNTER — APPOINTMENT (OUTPATIENT)
Dept: ORTHOPEDIC SURGERY | Facility: CLINIC | Age: 60
End: 2025-09-17
Payer: MEDICARE

## 2025-09-17 ENCOUNTER — NON-APPOINTMENT (OUTPATIENT)
Age: 60
End: 2025-09-17

## 2025-09-17 DIAGNOSIS — M21.372 FOOT DROP, LEFT FOOT: ICD-10-CM

## 2025-09-17 DIAGNOSIS — M54.16 RADICULOPATHY, LUMBAR REGION: ICD-10-CM

## 2025-09-17 DIAGNOSIS — M48.062 SPINAL STENOSIS, LUMBAR REGION WITH NEUROGENIC CLAUDICATION: ICD-10-CM

## 2025-09-17 DIAGNOSIS — M43.10 SPONDYLOLISTHESIS, SITE UNSPECIFIED: ICD-10-CM

## 2025-09-17 PROCEDURE — 99204 OFFICE O/P NEW MOD 45 MIN: CPT | Mod: 57

## (undated) DEVICE — GLV 6.5 PROTEXIS (WHITE)

## (undated) DEVICE — TROCAR GELPOINT MINI ADVANCED

## (undated) DEVICE — SOL IRR POUR NS 0.9% 500ML

## (undated) DEVICE — BLADE SCALPEL SAFETYLOCK #10

## (undated) DEVICE — Device

## (undated) DEVICE — DRAPE MAYO STAND 30"

## (undated) DEVICE — SUT VLOC 180 0 18" GS-21 GREEN

## (undated) DEVICE — DRAPE LIGHT HANDLE COVER (BLUE)

## (undated) DEVICE — VENODYNE/SCD SLEEVE CALF LARGE

## (undated) DEVICE — SPECIMEN CONTAINER 100ML

## (undated) DEVICE — APPLICATOR FOR ARISTA XL 38CM

## (undated) DEVICE — SUT VLOC 180 0 12" GS-21 GREEN

## (undated) DEVICE — DRSG STERISTRIPS 0.5 X 4"

## (undated) DEVICE — PREP BETADINE SPONGE STICKS

## (undated) DEVICE — MEDICATION LABELS W MARKER

## (undated) DEVICE — GRASPER COVIDIEN ENDO GRASP ROTICULATOR 5MM W SPIN LOCK

## (undated) DEVICE — VALVE YELLOW PORT SEAL PLUS 5MM

## (undated) DEVICE — SUT VLOC 180 0 9" GS-21 GREEN

## (undated) DEVICE — GOWN TRIMAX LG

## (undated) DEVICE — ENDOCATCH 10MM SPECIMEN POUCH

## (undated) DEVICE — LIGASURE BLUNT TIP 37CM

## (undated) DEVICE — ELCTR BOVIE PENCIL SMOKE EVACUATION

## (undated) DEVICE — DRAPE 3/4 SHEET W REINFORCEMENT 56X77"

## (undated) DEVICE — SOL IRR POUR H2O 250ML

## (undated) DEVICE — POSITIONER FOAM EGG CRATE ULNAR 2PCS (PINK)

## (undated) DEVICE — APPLICATOR VISTASEAL LAP DUAL 35CM RIGID

## (undated) DEVICE — SUT BIOSYN 4-0 18" P-12

## (undated) DEVICE — DRAPE INSTRUMENT POUCH 6.75" X 11"

## (undated) DEVICE — ENDOCATCH II 15MM

## (undated) DEVICE — GLV 7 PROTEXIS (WHITE)

## (undated) DEVICE — TROCAR APPLIED MEDICAL KII BALLOON BLUNT TIP 12MM X 100MM

## (undated) DEVICE — TUBING INSUFFLATION LAP FILTER 10FT

## (undated) DEVICE — SYR LUER LOK 10CC

## (undated) DEVICE — SUT POLYSORB 0 30" GS-23

## (undated) DEVICE — INSUFFLATION NDL COVIDIEN STEP 14G FOR STEP/VERSASTEP

## (undated) DEVICE — GLV 7.5 PROTEXIS (WHITE)

## (undated) DEVICE — FOLEY TRAY 16FR 5CC LTX UMETER CLOSED

## (undated) DEVICE — UTERINE MANIPULATOR COOPER SURGICAL 5MM 33CM GREEN

## (undated) DEVICE — PACK GYN LAPAROSCOPY

## (undated) DEVICE — NDL HYPO REGULAR BEVEL 22G X 1.5" (TURQUOISE)

## (undated) DEVICE — TUBING STRYKEFLOW II SUCTION / IRRIGATOR

## (undated) DEVICE — UTERINE MANIPULATOR CLINICAL INNOVATIONS CLEARVIEW 7CM

## (undated) DEVICE — RUMI TIP BLUE 6.7MM X 8CM

## (undated) DEVICE — DRAPE TOWEL BLUE 17" X 24"

## (undated) DEVICE — PREP CHLORAPREP HI-LITE ORANGE 26ML

## (undated) DEVICE — MARKING PEN W RULER

## (undated) DEVICE — TROCAR COVIDIEN BLUNT TIP HASSAN 10MM

## (undated) DEVICE — BLADE SCALPEL SAFETYLOCK #15

## (undated) DEVICE — TROCAR COVIDIEN VERSASTEP PLUS 11MM STANDARD

## (undated) DEVICE — WARMING BLANKET UPPER ADULT